# Patient Record
Sex: FEMALE | Race: WHITE | NOT HISPANIC OR LATINO | Employment: UNEMPLOYED | URBAN - METROPOLITAN AREA
[De-identification: names, ages, dates, MRNs, and addresses within clinical notes are randomized per-mention and may not be internally consistent; named-entity substitution may affect disease eponyms.]

---

## 2019-01-29 ENCOUNTER — HOSPITAL ENCOUNTER (EMERGENCY)
Facility: MEDICAL CENTER | Age: 44
End: 2019-01-29
Payer: MEDICAID

## 2019-01-29 VITALS
WEIGHT: 224.65 LBS | HEIGHT: 65 IN | DIASTOLIC BLOOD PRESSURE: 71 MMHG | BODY MASS INDEX: 37.43 KG/M2 | SYSTOLIC BLOOD PRESSURE: 123 MMHG | RESPIRATION RATE: 18 BRPM | HEART RATE: 91 BPM | OXYGEN SATURATION: 95 % | TEMPERATURE: 97 F

## 2019-01-29 LAB
S PYO AG THROAT QL: NORMAL
SIGNIFICANT IND 70042: NORMAL
SITE SITE: NORMAL
SOURCE SOURCE: NORMAL

## 2019-01-29 PROCEDURE — 87880 STREP A ASSAY W/OPTIC: CPT

## 2019-01-29 PROCEDURE — 302449 STATCHG TRIAGE ONLY (STATISTIC)

## 2019-01-29 PROCEDURE — 87081 CULTURE SCREEN ONLY: CPT

## 2019-01-29 NOTE — ED NOTES
Pt has to leave ER at this time because she has to be home in time for her son to get out of school.  Appt made at  now.  Informed that she was just roomed and she can go back now.  Unable to stay to be seen.  MARILYN paperwork signed.

## 2019-01-29 NOTE — ED NOTES
Throat swab sent to lab. Apologized for wait and assured that pt would be roomed as soon as possible.

## 2019-01-29 NOTE — ED TRIAGE NOTES
Amalia Gonzalez 43 y.o. female     Chief Complaint   Patient presents with   • Sore Throat     x1 week   • Cough     +wheezing   • Laryngitis        Pt returned to [facility] and educated on triage process.  Advised to notify RN with changes or concerns.

## 2019-01-30 ENCOUNTER — OFFICE VISIT (OUTPATIENT)
Dept: URGENT CARE | Facility: CLINIC | Age: 44
End: 2019-01-30
Payer: MEDICAID

## 2019-01-30 VITALS
RESPIRATION RATE: 16 BRPM | DIASTOLIC BLOOD PRESSURE: 70 MMHG | BODY MASS INDEX: 37.32 KG/M2 | HEIGHT: 65 IN | TEMPERATURE: 97.9 F | SYSTOLIC BLOOD PRESSURE: 124 MMHG | HEART RATE: 98 BPM | OXYGEN SATURATION: 96 % | WEIGHT: 224 LBS

## 2019-01-30 DIAGNOSIS — R05.9 COUGH: ICD-10-CM

## 2019-01-30 DIAGNOSIS — Z72.0 TOBACCO ABUSE: ICD-10-CM

## 2019-01-30 DIAGNOSIS — J20.9 ACUTE BRONCHITIS, UNSPECIFIED ORGANISM: ICD-10-CM

## 2019-01-30 PROCEDURE — 99406 BEHAV CHNG SMOKING 3-10 MIN: CPT | Performed by: NURSE PRACTITIONER

## 2019-01-30 PROCEDURE — 99204 OFFICE O/P NEW MOD 45 MIN: CPT | Mod: 25 | Performed by: NURSE PRACTITIONER

## 2019-01-30 RX ORDER — DEXTROMETHORPHAN HYDROBROMIDE AND PROMETHAZINE HYDROCHLORIDE 15; 6.25 MG/5ML; MG/5ML
5 SYRUP ORAL EVERY 4 HOURS PRN
Qty: 120 ML | Refills: 0 | Status: SHIPPED | OUTPATIENT
Start: 2019-01-30 | End: 2020-07-27

## 2019-01-30 RX ORDER — AZITHROMYCIN 250 MG/1
TABLET, FILM COATED ORAL
Qty: 6 TAB | Refills: 0 | Status: SHIPPED | OUTPATIENT
Start: 2019-01-30 | End: 2020-07-27

## 2019-01-30 RX ORDER — ALBUTEROL SULFATE 90 UG/1
2 AEROSOL, METERED RESPIRATORY (INHALATION) EVERY 6 HOURS PRN
Qty: 8.5 G | Refills: 0 | Status: SHIPPED | OUTPATIENT
Start: 2019-01-30 | End: 2020-07-27

## 2019-01-30 RX ORDER — METHYLPREDNISOLONE 4 MG/1
4 TABLET ORAL DAILY
Qty: 1 KIT | Refills: 0 | Status: SHIPPED | OUTPATIENT
Start: 2019-01-30 | End: 2020-07-27

## 2019-01-30 ASSESSMENT — ENCOUNTER SYMPTOMS
VOMITING: 0
FEVER: 0
SHORTNESS OF BREATH: 1
COUGH: 1
DIZZINESS: 0
SPUTUM PRODUCTION: 1
WHEEZING: 1
SORE THROAT: 0
EYE PAIN: 0
RHINORRHEA: 1
NAUSEA: 0
MYALGIAS: 1
CHILLS: 1

## 2019-01-30 ASSESSMENT — COPD QUESTIONNAIRES: COPD: 0

## 2019-01-31 LAB
S PYO SPEC QL CULT: NORMAL
SIGNIFICANT IND 70042: NORMAL
SITE SITE: NORMAL
SOURCE SOURCE: NORMAL

## 2019-01-31 NOTE — PROGRESS NOTES
Subjective:     Amalia Gonzalez is a 43 y.o. female who presents for Cough (SOB, congestion for a week)       Cough   This is a new problem. The current episode started in the past 7 days. The problem has been gradually worsening. The problem occurs constantly. The cough is productive of sputum. Associated symptoms include chills, myalgias, nasal congestion, postnasal drip, rhinorrhea, shortness of breath and wheezing. Pertinent negatives include no chest pain, fever, rash or sore throat. Nothing aggravates the symptoms. She has tried OTC cough suppressant for the symptoms. The treatment provided no relief. Her past medical history is significant for bronchitis. There is no history of COPD.     Past Medical History:   Diagnosis Date   • Allergy    • Depression     Pt. states was put on Lexapro   • Headache(784.0)    • Substance abuse (HCC)     meth pt. states stopped with pregnancy.   • Urinary tract infection, site not specified      Past Surgical History:   Procedure Laterality Date   • REPEAT C SECTION W TUBAL LIGATION N/A 2015    Procedure: REPEAT C SECTION WITH TUBAL LIGATION;  Surgeon: Bela Bansal M.D.;  Location: LABOR AND DELIVERY;  Service:    • PRIMARY C SECTION         • TONSILLECTOMY       Social History     Social History   • Marital status: Single     Spouse name: N/A   • Number of children: N/A   • Years of education: N/A     Occupational History   • Not on file.     Social History Main Topics   • Smoking status: Current Every Day Smoker     Packs/day: 0.25     Years: 20.00     Types: Cigarettes     Start date: 2015   • Smokeless tobacco: Never Used      Comment: 5-10 cigs/day   • Alcohol use No      Comment: occ   • Drug use: No      Comment: iv meth, last used in 2015   • Sexual activity: Yes     Partners: Male     Birth control/ protection: Condom     Other Topics Concern   • Not on file     Social History Narrative   • No narrative on file      Family History  "  Problem Relation Age of Onset   • Hyperlipidemia Mother    • Hypertension Father    • Hyperlipidemia Father     Review of Systems   Constitutional: Positive for chills and malaise/fatigue. Negative for fever.   HENT: Positive for congestion, postnasal drip and rhinorrhea. Negative for sore throat.    Eyes: Negative for pain.   Respiratory: Positive for cough, sputum production, shortness of breath and wheezing.    Cardiovascular: Negative for chest pain.   Gastrointestinal: Negative for nausea and vomiting.   Genitourinary: Negative for hematuria.   Musculoskeletal: Positive for myalgias.   Skin: Negative for rash.   Neurological: Negative for dizziness.     Allergies   Allergen Reactions   • Tape      Nath skin      Objective:   /70 (BP Location: Left arm, Patient Position: Sitting, BP Cuff Size: Adult)   Pulse 98   Temp 36.6 °C (97.9 °F) (Temporal)   Resp 16   Ht 1.651 m (5' 5\")   Wt 101.6 kg (224 lb)   LMP 01/05/2019 (Approximate)   SpO2 96%   Breastfeeding? No   BMI 37.28 kg/m²   Physical Exam   Constitutional: She is oriented to person, place, and time. She appears well-developed and well-nourished. No distress.   HENT:   Head: Normocephalic and atraumatic.   Right Ear: Tympanic membrane normal.   Left Ear: Tympanic membrane normal.   Nose: Nose normal. Right sinus exhibits no maxillary sinus tenderness and no frontal sinus tenderness. Left sinus exhibits no maxillary sinus tenderness and no frontal sinus tenderness.   Mouth/Throat: Uvula is midline, oropharynx is clear and moist and mucous membranes are normal. No posterior oropharyngeal edema, posterior oropharyngeal erythema or tonsillar abscesses. No tonsillar exudate.   Eyes: Pupils are equal, round, and reactive to light. Conjunctivae and EOM are normal. Right eye exhibits no discharge. Left eye exhibits no discharge.   Cardiovascular: Normal rate and regular rhythm.    No murmur heard.  Pulmonary/Chest: Effort normal. No respiratory " distress. She has no decreased breath sounds. She has wheezes. She has no rhonchi. She has no rales.   Abdominal: Soft. She exhibits no distension. There is no tenderness.   Neurological: She is alert and oriented to person, place, and time. She has normal reflexes. No sensory deficit.   Skin: Skin is warm, dry and intact.   Psychiatric: She has a normal mood and affect.         Assessment/Plan:   Assessment    1. Acute bronchitis, unspecified organism  azithromycin (ZITHROMAX) 250 MG Tab    promethazine-dextromethorphan (PROMETHAZINE-DM) 6.25-15 MG/5ML syrup    MethylPREDNISolone (MEDROL DOSEPAK) 4 MG Tablet Therapy Pack    albuterol 108 (90 Base) MCG/ACT Aero Soln inhalation aerosol   2. Cough  azithromycin (ZITHROMAX) 250 MG Tab    promethazine-dextromethorphan (PROMETHAZINE-DM) 6.25-15 MG/5ML syrup    MethylPREDNISolone (MEDROL DOSEPAK) 4 MG Tablet Therapy Pack    albuterol 108 (90 Base) MCG/ACT Aero Soln inhalation aerosol   3. Tobacco abuse       Advised to continue supportive care with Tylenol and/or ibuprofen for fevers and discomfort. Increased fluids and electrolytes.  Smoking cessation was discussed today for longer than 3 min. OTC Smoking cessation aids were discussed and pt. will consider such, however is not ready to quit at this time.   Patient given precautionary s/sx that mandate immediate follow up and evaluation in the ED. Advised of risks of not doing so.    DDX, Supportive care, and indications for immediate follow-up discussed with patient.    Instructed to return to clinic or nearest emergency department if we are not available for any change in condition, further concerns, or worsening of symptoms.    The patient demonstrated a good understanding and agreed with the treatment plan.

## 2019-03-27 ENCOUNTER — OFFICE VISIT (OUTPATIENT)
Dept: URGENT CARE | Facility: CLINIC | Age: 44
End: 2019-03-27
Payer: MEDICAID

## 2019-03-27 ENCOUNTER — HOSPITAL ENCOUNTER (OUTPATIENT)
Facility: MEDICAL CENTER | Age: 44
End: 2019-03-27
Attending: PHYSICIAN ASSISTANT
Payer: MEDICAID

## 2019-03-27 VITALS
BODY MASS INDEX: 34.99 KG/M2 | WEIGHT: 210 LBS | HEART RATE: 85 BPM | DIASTOLIC BLOOD PRESSURE: 78 MMHG | OXYGEN SATURATION: 95 % | TEMPERATURE: 98.3 F | SYSTOLIC BLOOD PRESSURE: 104 MMHG | RESPIRATION RATE: 16 BRPM | HEIGHT: 65 IN

## 2019-03-27 DIAGNOSIS — A59.01 TRICHOMONAL VAGINITIS: ICD-10-CM

## 2019-03-27 DIAGNOSIS — F17.200 SMOKER: ICD-10-CM

## 2019-03-27 DIAGNOSIS — A49.1 GROUP B STREPTOCOCCAL INFECTION: ICD-10-CM

## 2019-03-27 DIAGNOSIS — N89.8 VAGINAL DISCHARGE: ICD-10-CM

## 2019-03-27 DIAGNOSIS — Z91.89 AT RISK FOR SEXUALLY TRANSMITTED DISEASE DUE TO UNPROTECTED SEX: ICD-10-CM

## 2019-03-27 LAB
APPEARANCE UR: NORMAL
BILIRUB UR STRIP-MCNC: NEGATIVE MG/DL
COLOR UR AUTO: YELLOW
FORWARD REASON: SPWHY: NORMAL
FORWARDED TO LAB: SPWHR: NORMAL
GLUCOSE UR STRIP.AUTO-MCNC: NEGATIVE MG/DL
KETONES UR STRIP.AUTO-MCNC: NEGATIVE MG/DL
LEUKOCYTE ESTERASE UR QL STRIP.AUTO: NORMAL
NITRITE UR QL STRIP.AUTO: NEGATIVE
PH UR STRIP.AUTO: 5 [PH] (ref 5–8)
PROT UR QL STRIP: NEGATIVE MG/DL
RBC UR QL AUTO: NORMAL
SP GR UR STRIP.AUTO: 1.02
SPECIMEN SENT: SPWT1: NORMAL
UROBILINOGEN UR STRIP-MCNC: NORMAL MG/DL

## 2019-03-27 PROCEDURE — 99214 OFFICE O/P EST MOD 30 MIN: CPT | Performed by: PHYSICIAN ASSISTANT

## 2019-03-27 PROCEDURE — 81002 URINALYSIS NONAUTO W/O SCOPE: CPT | Performed by: PHYSICIAN ASSISTANT

## 2019-03-27 PROCEDURE — 99406 BEHAV CHNG SMOKING 3-10 MIN: CPT | Performed by: PHYSICIAN ASSISTANT

## 2019-03-27 RX ORDER — AZITHROMYCIN 500 MG/1
1000 TABLET, FILM COATED ORAL ONCE
Qty: 2 TAB | Refills: 0 | Status: SHIPPED | OUTPATIENT
Start: 2019-03-27 | End: 2019-03-27

## 2019-03-27 ASSESSMENT — ENCOUNTER SYMPTOMS
MYALGIAS: 0
FEVER: 0
VOMITING: 0
ABDOMINAL PAIN: 1
NAUSEA: 0
DIARRHEA: 0
FLANK PAIN: 0
CHILLS: 0

## 2019-03-27 NOTE — PROGRESS NOTES
"Subjective:   Amalia Gonzalez is a 44 y.o. female who presents for Other (pt wants to get STD tested)    This is a new problem.  Patient presents to urgent care with concern for STD.  She had unprotected sex approximately 2-3 weeks ago.  One week ago she began to develop copious yellow-green vaginal discharge with odor as well as dysuria.  She denies urgency or frequency.  She has had no fever.  She reports that using her tampon with her.  Last week was rather painful.    Patient is breast-feeding her 4-year-old daughter only at night.  She is concerned about possibly passing STD through the breastmilk to her daughter.        Other   Associated symptoms include abdominal pain. Pertinent negatives include no chills, fever, myalgias, nausea or vomiting.     Review of Systems   Constitutional: Negative for chills, fever and malaise/fatigue.   Gastrointestinal: Positive for abdominal pain. Negative for diarrhea, nausea and vomiting.   Genitourinary: Positive for dysuria. Negative for flank pain, frequency, hematuria and urgency.   Musculoskeletal: Negative for myalgias.   All other systems reviewed and are negative.    Allergies   Allergen Reactions   • Tape      Nath skin        Objective:   /78   Pulse 85   Temp 36.8 °C (98.3 °F)   Resp 16   Ht 1.651 m (5' 5\")   Wt 95.3 kg (210 lb)   SpO2 95%   BMI 34.95 kg/m²   Physical Exam   Constitutional: She is oriented to person, place, and time. She appears well-developed and well-nourished.   HENT:   Head: Normocephalic and atraumatic.   Right Ear: Tympanic membrane, external ear and ear canal normal.   Left Ear: Tympanic membrane, external ear and ear canal normal.   Nose: Nose normal.   Mouth/Throat: Uvula is midline, oropharynx is clear and moist and mucous membranes are normal. No oropharyngeal exudate.   Eyes: Pupils are equal, round, and reactive to light. Conjunctivae and EOM are normal.   Neck: Normal range of motion. Neck supple.   Cardiovascular: " Normal rate, regular rhythm and normal heart sounds.  Exam reveals no gallop and no friction rub.    No murmur heard.  Pulmonary/Chest: Effort normal and breath sounds normal. No respiratory distress. She has no wheezes. She has no rales.   Abdominal: Soft. Bowel sounds are normal. She exhibits no distension and no mass. There is no hepatosplenomegaly. There is tenderness in the right lower quadrant. There is no rigidity, no rebound and no guarding.   Genitourinary: Rectum normal and uterus normal. Pelvic exam was performed with patient supine. There is no rash, tenderness, lesion or injury on the right labia. There is no rash, tenderness, lesion or injury on the left labia. Cervix exhibits discharge. Cervix exhibits no motion tenderness and no friability. Right adnexum displays no mass, no tenderness and no fullness. Left adnexum displays no mass, no tenderness and no fullness. No bleeding in the vagina. No foreign body in the vagina. Vaginal discharge found.   Musculoskeletal: Normal range of motion. She exhibits no edema, tenderness or deformity.   Lymphadenopathy:        Head (right side): No submental, no submandibular and no tonsillar adenopathy present.        Head (left side): No submental, no submandibular and no tonsillar adenopathy present.     She has no cervical adenopathy.        Right: No supraclavicular adenopathy present.        Left: No supraclavicular adenopathy present.   Neurological: She is alert and oriented to person, place, and time. She has normal strength. No cranial nerve deficit or sensory deficit. Coordination normal.   Skin: Skin is warm and dry. No rash noted.   Psychiatric: She has a normal mood and affect. Judgment normal.   Vitals reviewed.          Assessment/Plan:   Assessment    1. Vaginal discharge  - CHLAMYDIA/GC PCR URINE OR SWAB; Future  - POCT Urinalysis  - URINE CULTURE  - azithromycin (ZITHROMAX) 500 MG tablet; Take 2 Tabs by mouth Once for 1 dose.  Dispense: 2 Tab;  Refill: 0  - cefTRIAXone (ROCEPHIN) 250 mg, lidocaine (XYLOCAINE) 1 % 0.9 mL for IM use; 250 mg by Intramuscular route Once.    2. Lactating mother    3. At risk for sexually transmitted disease due to unprotected sex  - CHLAMYDIA/GC PCR URINE OR SWAB; Future  - POCT Urinalysis  - URINE CULTURE  - azithromycin (ZITHROMAX) 500 MG tablet; Take 2 Tabs by mouth Once for 1 dose.  Dispense: 2 Tab; Refill: 0  - cefTRIAXone (ROCEPHIN) 250 mg, lidocaine (XYLOCAINE) 1 % 0.9 mL for IM use; 250 mg by Intramuscular route Once.    4. Smoker  Greater than 3 minutes spent counseling on nicotine dependence, associated disease process and affect on present illness. Counseled regarding cessation. Not ready to quit at this time.       Testing for chlamydia and gonorrhea as well as vaginal pathogens obtained.  Patient will be empirically treated with Rocephin 250 mg IM and Zithromax 1 g dose x1.  Recommend use of condoms.  If testing positive partner will need to be tested and treated.  Urinalysis has trace leukocyte esterase and trace blood.  However, I suspect this is contaminated from vaginal discharge.  We will go ahead and send a urine culture however I am not going to additionally treat for UTI at this time.    Counseled patient on safety of breast-feeding with these medications.  Did review that if they do go through breast milk but in small amount and with her only breast-feeding at night that should not be an issue.      Differential diagnosis, natural history, supportive care, and indications for immediate follow-up discussed.    If not improving in 3-5 days, F/U with PCP or return to  or sooner if worsens  Strict ER precautions given.    Please note that this note was created using voice recognition speech to text software. Every effort has been made to correct obvious errors.  However, I expect there are errors of grammar and possibly context that were not discovered prior to finalizing the note

## 2019-04-04 ENCOUNTER — TELEPHONE (OUTPATIENT)
Dept: URGENT CARE | Facility: CLINIC | Age: 44
End: 2019-04-04

## 2019-04-04 DIAGNOSIS — N89.8 VAGINAL DISCHARGE: ICD-10-CM

## 2019-04-04 DIAGNOSIS — Z91.89 AT RISK FOR SEXUALLY TRANSMITTED DISEASE DUE TO UNPROTECTED SEX: ICD-10-CM

## 2019-04-04 RX ORDER — AMOXICILLIN 500 MG/1
500 CAPSULE ORAL 3 TIMES DAILY
Qty: 15 CAP | Refills: 0 | Status: SHIPPED | OUTPATIENT
Start: 2019-04-04 | End: 2019-04-09

## 2019-04-04 RX ORDER — METRONIDAZOLE 500 MG/1
500 TABLET ORAL 2 TIMES DAILY
Qty: 14 TAB | Refills: 0 | Status: SHIPPED | OUTPATIENT
Start: 2019-04-04 | End: 2019-04-11

## 2019-04-04 NOTE — TELEPHONE ENCOUNTER
Attempted to contact patient to notify of testing + for Trichomonas, Gardnerella and urine culture + for Group B Strep.  No answer. Voicemail left for patient to return call and speak with MA at [facility] as this provider is out of town and not available. I will order Metronidazole and Amoxicillin for  at pharmacy. Partner will require treatment for Trichomonas

## 2019-04-05 NOTE — TELEPHONE ENCOUNTER
Pt would like you to call her. She had a lot of questions I wasn't comfortable answering.  I did tell her there is 2 medications waiting for her at the pharmacy.

## 2019-05-30 ENCOUNTER — OFFICE VISIT (OUTPATIENT)
Dept: URGENT CARE | Facility: CLINIC | Age: 44
End: 2019-05-30
Payer: MEDICAID

## 2019-05-30 ENCOUNTER — HOSPITAL ENCOUNTER (OUTPATIENT)
Facility: MEDICAL CENTER | Age: 44
End: 2019-05-30
Attending: PHYSICIAN ASSISTANT
Payer: MEDICAID

## 2019-05-30 VITALS
SYSTOLIC BLOOD PRESSURE: 128 MMHG | DIASTOLIC BLOOD PRESSURE: 82 MMHG | BODY MASS INDEX: 34.99 KG/M2 | RESPIRATION RATE: 16 BRPM | WEIGHT: 210 LBS | HEIGHT: 65 IN | HEART RATE: 117 BPM | OXYGEN SATURATION: 97 % | TEMPERATURE: 98.1 F

## 2019-05-30 DIAGNOSIS — N89.8 VAGINAL DISCHARGE: ICD-10-CM

## 2019-05-30 LAB
APPEARANCE UR: NORMAL
BILIRUB UR STRIP-MCNC: NEGATIVE MG/DL
COLOR UR AUTO: YELLOW
FORWARD REASON: SPWHY: NORMAL
FORWARDED TO LAB: SPWHR: NORMAL
GLUCOSE UR STRIP.AUTO-MCNC: NEGATIVE MG/DL
KETONES UR STRIP.AUTO-MCNC: NEGATIVE MG/DL
LEUKOCYTE ESTERASE UR QL STRIP.AUTO: NORMAL
NITRITE UR QL STRIP.AUTO: NEGATIVE
PH UR STRIP.AUTO: 5.5 [PH] (ref 5–8)
PROT UR QL STRIP: 30 MG/DL
RBC UR QL AUTO: NORMAL
SP GR UR STRIP.AUTO: 1.03
SPECIMEN SENT (2ND): SPWT2: NORMAL
SPECIMEN SENT (3RD): SPWT3: NORMAL
SPECIMEN SENT: SPWT1: NORMAL
UROBILINOGEN UR STRIP-MCNC: 0.2 MG/DL

## 2019-05-30 PROCEDURE — 81002 URINALYSIS NONAUTO W/O SCOPE: CPT | Performed by: PHYSICIAN ASSISTANT

## 2019-05-30 PROCEDURE — 99214 OFFICE O/P EST MOD 30 MIN: CPT | Performed by: PHYSICIAN ASSISTANT

## 2019-05-30 RX ORDER — METRONIDAZOLE 500 MG/1
500 TABLET ORAL 2 TIMES DAILY
Qty: 14 TAB | Refills: 0 | Status: SHIPPED | OUTPATIENT
Start: 2019-05-30 | End: 2019-06-06

## 2019-05-30 ASSESSMENT — ENCOUNTER SYMPTOMS
DIARRHEA: 0
FLANK PAIN: 0
FEVER: 0
VOMITING: 0
NAUSEA: 0
CONSTIPATION: 0
VAGINITIS: 1
SHORTNESS OF BREATH: 0
CHILLS: 0
ABDOMINAL PAIN: 0
MUSCULOSKELETAL NEGATIVE: 1
DIZZINESS: 0

## 2019-05-31 NOTE — PROGRESS NOTES
Subjective:      Amlaia Gonzalez is a 44 y.o. female who presents with Vaginitis (hx of STI, discharged, itchy, painful urination, raw x a week)            Vaginitis   The patient's primary symptoms include genital itching, a genital odor and vaginal discharge. The patient's pertinent negatives include no genital rash or pelvic pain. This is a new problem. The current episode started in the past 7 days. The problem occurs constantly. The problem has been unchanged. The pain is mild. The problem affects both sides. She is not pregnant. Associated symptoms include dysuria. Pertinent negatives include no abdominal pain, chills, constipation, diarrhea, fever, flank pain, frequency, hematuria, nausea, rash, urgency or vomiting. The vaginal discharge was malodorous and grey. There has been no bleeding. The symptoms are aggravated by urinating. She has tried nothing for the symptoms. She is sexually active. No, her partner does not have an STD. She uses tubal ligation for contraception. Her past medical history is significant for an abdominal surgery, a gynecological surgery and an STD.     Patient presents to urgent care reporting a 1 week history of malodorous vaginal discharge with associated irritation and dysuria. She states she was previously diagnosed with trichomonas 2 months ago. She recently had unprotected intercourse with the same partner and states he was never treated.     Review of Systems   Constitutional: Negative for chills and fever.   HENT: Negative for congestion.    Respiratory: Negative for shortness of breath.    Cardiovascular: Negative for chest pain.   Gastrointestinal: Negative for abdominal pain, constipation, diarrhea, nausea and vomiting.   Genitourinary: Positive for dysuria and vaginal discharge. Negative for flank pain, frequency, hematuria, pelvic pain and urgency.        + malodorous vaginal discharge   Musculoskeletal: Negative.    Skin: Negative for itching and rash.   Neurological:  "Negative for dizziness.        Objective:     /82   Pulse (!) 117   Temp 36.7 °C (98.1 °F)   Resp 16   Ht 1.651 m (5' 5\")   Wt 95.3 kg (210 lb)   SpO2 97%   BMI 34.95 kg/m²      Physical Exam   Constitutional: She is oriented to person, place, and time. She appears well-developed and well-nourished. No distress.   HENT:   Head: Normocephalic and atraumatic.   Eyes: Pupils are equal, round, and reactive to light. Conjunctivae are normal.   Neck: Normal range of motion.   Cardiovascular: Normal rate.    Pulmonary/Chest: Effort normal.   Abdominal: Soft. Bowel sounds are normal. She exhibits no distension. There is no tenderness. There is no guarding.   Genitourinary:   Genitourinary Comments: Exam declined   Musculoskeletal: Normal range of motion.   Neurological: She is alert and oriented to person, place, and time.   Skin: Skin is warm and dry. She is not diaphoretic.   Psychiatric: She has a normal mood and affect. Her behavior is normal.   Nursing note and vitals reviewed.         PMH:  has a past medical history of Allergy; Depression (2004); Headache(784.0); Substance abuse ([facility]) (2015); and Urinary tract infection, site not specified.  MEDS:   Current Outpatient Prescriptions:   •  metroNIDAZOLE (FLAGYL) 500 MG Tab, Take 1 Tab by mouth 2 Times a Day for 7 days., Disp: 14 Tab, Rfl: 0  •  azithromycin (ZITHROMAX) 250 MG Tab, Take 2 tablets by mouth on day one. Take one tablet by mouth the remaining days until gone (Patient not taking: Reported on 5/30/2019), Disp: 6 Tab, Rfl: 0  •  promethazine-dextromethorphan (PROMETHAZINE-DM) 6.25-15 MG/5ML syrup, Take 5 mL by mouth every four hours as needed. (Patient not taking: Reported on 5/30/2019), Disp: 120 mL, Rfl: 0  •  MethylPREDNISolone (MEDROL DOSEPAK) 4 MG Tablet Therapy Pack, Take 1 Tab by mouth every day. (Patient not taking: Reported on 5/30/2019), Disp: 1 Kit, Rfl: 0  •  albuterol 108 (90 Base) MCG/ACT Aero Soln inhalation aerosol, Inhale 2 Puffs " by mouth every 6 hours as needed for Shortness of Breath. (Patient not taking: Reported on 2019), Disp: 8.5 g, Rfl: 0  •  ibuprofen (MOTRIN) 600 MG Tab, Take 1 Tab by mouth every 6 hours as needed (Cramping). (Patient not taking: Reported on 2019), Disp: 30 Tab, Rfl: 3  •  Prenatal MV-Min-Fe Fum-FA-DHA (PRENATAL 1 PO), Take  by mouth., Disp: , Rfl:   ALLERGIES:   Allergies   Allergen Reactions   • Tape      Burns skin     SURGHX:   Past Surgical History:   Procedure Laterality Date   • REPEAT C SECTION W TUBAL LIGATION N/A 2015    Procedure: REPEAT C SECTION WITH TUBAL LIGATION;  Surgeon: Bela Bansal M.D.;  Location: LABOR AND DELIVERY;  Service:    • PRIMARY C SECTION         • TONSILLECTOMY       SOCHX:  reports that she has been smoking Cigarettes.  She started smoking about 4 years ago. She has a 5.00 pack-year smoking history. She has never used smokeless tobacco. She reports that she does not drink alcohol or use drugs.  FH: family history includes Hyperlipidemia in her father and mother; Hypertension in her father.     POCT Urinalysis:  Component Results     Component Value Ref Range & Units Status   POC Color yellow  Negative Final   POC Appearance cloudy  Negative Final   POC Leukocyte Esterase trace  Negative Final   POC Nitrites negative  Negative Final   POC Urobiligen 0.2  Negative (0.2) mg/dL Final   POC Protein 30  Negative mg/dL Final   POC Urine PH 5.5  5.0 - 8.0 Final   POC Blood trace-intact  Negative Final   POC Specific Gravity 1.030  <1.005 - >1.030 Final   POC Ketones negative  Negative mg/dL Final   POC Bilirubin negative  Negative mg/dL Final   POC Glucose negative  Negative mg/dL Final   Last Resulted Time   Thu May 30, 2019  4:05 PM       Assessment/Plan:     1. Vaginal discharge  - POCT Urinalysis --> trace leuks and blood, otherwise normal  - VAGINAL PATHOGENS DNA PANEL; Future  - CHLAMYDIA/GC PCR URINE OR SWAB; Future  - URINE CULTURE(NEW); Future  -  metroNIDAZOLE (FLAGYL) 500 MG Tab; Take 1 Tab by mouth 2 Times a Day for 7 days.  Dispense: 14 Tab; Refill: 0    Will treat empirically for suspected Trichomonas or possible BV with 1 week course of Metronidazole. Avoid any alcohol intake while taking course of antibiotics. Pending swab results. The patient demonstrated a good understanding and agreed with the treatment plan.

## 2019-06-11 ENCOUNTER — TELEPHONE (OUTPATIENT)
Dept: URGENT CARE | Facility: CLINIC | Age: 44
End: 2019-06-11

## 2019-06-11 NOTE — TELEPHONE ENCOUNTER
Attempted to contact patient regarding vaginal pathogens and urine culture results. Phone number listed not accepting incoming calls.

## 2020-05-07 ENCOUNTER — OFFICE VISIT (OUTPATIENT)
Dept: URGENT CARE | Facility: CLINIC | Age: 45
End: 2020-05-07
Payer: MEDICAID

## 2020-05-07 VITALS
OXYGEN SATURATION: 96 % | DIASTOLIC BLOOD PRESSURE: 76 MMHG | WEIGHT: 200 LBS | SYSTOLIC BLOOD PRESSURE: 112 MMHG | RESPIRATION RATE: 16 BRPM | TEMPERATURE: 98.2 F | BODY MASS INDEX: 33.32 KG/M2 | HEART RATE: 106 BPM | HEIGHT: 65 IN

## 2020-05-07 DIAGNOSIS — R21 SKIN RASH: ICD-10-CM

## 2020-05-07 DIAGNOSIS — R51.9 HEADACHE, UNSPECIFIED HEADACHE TYPE: ICD-10-CM

## 2020-05-07 PROCEDURE — 99214 OFFICE O/P EST MOD 30 MIN: CPT | Performed by: NURSE PRACTITIONER

## 2020-05-07 RX ORDER — FLUCONAZOLE 150 MG/1
TABLET ORAL
COMMUNITY
Start: 2020-04-10 | End: 2020-07-29

## 2020-05-07 RX ORDER — CEPHALEXIN 500 MG/1
CAPSULE ORAL
COMMUNITY
Start: 2020-04-09 | End: 2020-07-27

## 2020-05-07 RX ORDER — CARISOPRODOL 350 MG/1
350 TABLET ORAL EVERY 8 HOURS PRN
Qty: 15 TAB | Refills: 0 | Status: SHIPPED | OUTPATIENT
Start: 2020-05-07 | End: 2020-05-14

## 2020-05-07 RX ORDER — SULFAMETHOXAZOLE AND TRIMETHOPRIM 800; 160 MG/1; MG/1
TABLET ORAL
COMMUNITY
Start: 2020-04-09 | End: 2020-07-27

## 2020-05-07 RX ORDER — TRIAMCINOLONE ACETONIDE 1 MG/G
CREAM TOPICAL
COMMUNITY
Start: 2020-04-10 | End: 2020-07-29

## 2020-05-07 RX ORDER — PREDNISONE 10 MG/1
TABLET ORAL
Qty: 15 TAB | Refills: 0 | Status: SHIPPED | OUTPATIENT
Start: 2020-05-07 | End: 2020-07-29

## 2020-05-07 ASSESSMENT — ENCOUNTER SYMPTOMS
FEVER: 0
HEADACHES: 1
CHILLS: 0

## 2020-05-07 NOTE — PROGRESS NOTES
Subjective:      Amalia Gonzalez is a 45 y.o. female who presents with Rash (abdomin, arms back and chest x1 month )    Past Medical History:   Diagnosis Date   • Allergy    • Depression 2004    Pt. states was put on Lexapro   • Headache(784.0)    • Substance abuse (HCC) 2015    meth pt. states stopped with pregnancy.   • Urinary tract infection, site not specified      Social History     Socioeconomic History   • Marital status: Single     Spouse name: Not on file   • Number of children: Not on file   • Years of education: Not on file   • Highest education level: Not on file   Occupational History   • Not on file   Social Needs   • Financial resource strain: Not on file   • Food insecurity     Worry: Not on file     Inability: Not on file   • Transportation needs     Medical: Not on file     Non-medical: Not on file   Tobacco Use   • Smoking status: Current Every Day Smoker     Packs/day: 0.25     Years: 20.00     Pack years: 5.00     Types: Cigarettes     Start date: 2/1/2015   • Smokeless tobacco: Never Used   • Tobacco comment: 5-10 cigs/day   Substance and Sexual Activity   • Alcohol use: No     Comment: occ   • Drug use: No     Types: Methamphetamines     Comment: iv meth, last used in 2/2015   • Sexual activity: Yes     Partners: Male     Birth control/protection: Condom   Lifestyle   • Physical activity     Days per week: Not on file     Minutes per session: Not on file   • Stress: Not on file   Relationships   • Social connections     Talks on phone: Not on file     Gets together: Not on file     Attends [employer] service: Not on file     Active member of club or organization: Not on file     Attends meetings of clubs or organizations: Not on file     Relationship status: Not on file   • Intimate partner violence     Fear of current or ex partner: Not on file     Emotionally abused: Not on file     Physically abused: Not on file     Forced sexual activity: Not on file   Other Topics Concern   • Not on file    Social History Narrative   • Not on file     Family History   Problem Relation Age of Onset   • Hyperlipidemia Mother    • Hypertension Father    • Hyperlipidemia Father        Allergies: Tape    Patient 45-year-old female who presents with multiple complaints.    Complaint #1:  Rash to the upper extremities, trunk, and back.  Symptoms started 1 month ago.  Patient was on antibiotics for a boil in the groin area and that has resolved.  She states at some point after finishing the antibiotic she developed this rash.  She has not had any itching or swelling noted with this.  She states she believes the rash to be spreading and at times the rash appears to be darker than at other times.  No fever, aches, or chills. Patient states she did not have an initial herald patch, but states that the rash started on her upper extremities and was widespread immediately.      Complaint #2:  Intestinal discomfort and bloating.  Patient states that over the last 1 to 2 weeks every time she eats anything she feels bloated with a distended abdomen followed by diarrhea.  No nausea or vomiting.  No fever.    Complaint #3:  Headaches.  Patient states that she has had a longstanding history of migraine headaches that have not bothered her for more than a year.  Patient states around the time of the onset of the rash she believes she started having headaches again.  She describes the headache is bilateral frontal area, with intermittent light sensitivity.  Nothing she has tried over-the-counter as far as NSAIDs has been helpful.  Patient states that she has taken Soma in the past for her headaches and that this is been helpful.    Complaint #4:    Intermittent circular yellow light that she notices in her visual field on the right eye.  This started over the last 1 to 2 months.  Patient states that several weeks ago she was cleaning something at home and felt a small pop in her eye and then noted that she had a sub-conjunctival  "hemorrhage.  That has resolved, but patient states that she believes she sees these flashes of light periodically ever since.        Other   This is a new problem. The current episode started in the past 7 days. The problem occurs constantly. The problem has been unchanged. Associated symptoms include headaches and a rash. Pertinent negatives include no chills or fever. Associated symptoms comments: Rash, headache. Nothing aggravates the symptoms. She has tried nothing for the symptoms. The treatment provided no relief.       Review of Systems   Constitutional: Negative for chills and fever.   Skin: Positive for rash.   Neurological: Positive for headaches.   All other systems reviewed and are negative.         Objective:     /76   Pulse (!) 106   Temp 36.8 °C (98.2 °F) (Temporal)   Resp 16   Ht 1.651 m (5' 5\")   Wt 90.7 kg (200 lb)   SpO2 96%   BMI 33.28 kg/m²      Physical Exam  Vitals signs reviewed.   Constitutional:       Appearance: Normal appearance.   HENT:      Head: Normocephalic and atraumatic.      Right Ear: Tympanic membrane, ear canal and external ear normal.      Left Ear: Tympanic membrane, ear canal and external ear normal.      Nose: Nose normal.      Mouth/Throat:      Mouth: Mucous membranes are moist.   Eyes:      General: No visual field deficit.     Extraocular Movements: Extraocular movements intact.      Conjunctiva/sclera: Conjunctivae normal.      Pupils: Pupils are equal, round, and reactive to light.   Neck:      Musculoskeletal: Normal range of motion and neck supple.   Cardiovascular:      Rate and Rhythm: Normal rate and regular rhythm.      Heart sounds: Normal heart sounds.   Pulmonary:      Effort: Pulmonary effort is normal.      Breath sounds: Normal breath sounds.   Musculoskeletal: Normal range of motion.   Skin:     General: Skin is warm and dry.      Capillary Refill: Capillary refill takes less than 2 seconds.      Findings: Rash present.   Neurological:      " General: No focal deficit present.      Mental Status: She is alert and oriented to person, place, and time.      GCS: GCS eye subscore is 4. GCS verbal subscore is 5. GCS motor subscore is 6.      Cranial Nerves: Cranial nerves are intact. No cranial nerve deficit, dysarthria or facial asymmetry.      Sensory: Sensation is intact. No sensory deficit.      Motor: Motor function is intact. No weakness, tremor, atrophy, abnormal muscle tone, seizure activity or pronator drift.      Coordination: Coordination is intact. Romberg sign negative. Coordination normal. Finger-Nose-Finger Test normal.      Gait: Gait is intact. Gait normal.      Deep Tendon Reflexes: Reflexes normal.      Comments: Cranial nerves II through XII intact.  Cerebellar function intact.  Motor coordination intact.  Romberg negative.  No pronator drift.  Pupils equally round and reactive, EOMs intact.  Facial features symmetric with equal movement.  Speech is clear and logical.  Shoulder shrug equal.   5/5 and equal in the upper extremities.  Strength 5/5 and equal in the upper and lower extremities.  Gait is even and steady.  Patient is awake, alert, oriented x3.  Sensation intact.   Psychiatric:         Mood and Affect: Mood normal.         Behavior: Behavior normal.       Patient has a generalized rash to the upper extremities, trunk, back, and chest.  Rash is light pink and annular but no scaling.  Mild swelling to these areas.  No excoriation.            Assessment/Plan:     1. Skin rash  2. Headache, unspecified headache type  3.  Intermittent diarrhea  4.  Nonacute visual changes    Strict ER precautions given for decreasing vision or persistent symptoms with her right eye; otherwise I have advised patient to follow-up with her ophthalmologist  Prednisone p.o.  Soma PRN for headache; clearly stated no driving or alcohol with this medication.  Checked patient's  and find no evidence for narcotic misuse  Brat diet, bland diet  Patient  is advised also to schedule a follow-up appointment with her primary care physician  Return to urgent care otherwise for any further questions or concerns  Strict ER precautions as discussed above for worsening of symptoms

## 2020-07-27 ENCOUNTER — OFFICE VISIT (OUTPATIENT)
Dept: URGENT CARE | Facility: CLINIC | Age: 45
End: 2020-07-27
Payer: MEDICAID

## 2020-07-27 VITALS
DIASTOLIC BLOOD PRESSURE: 68 MMHG | RESPIRATION RATE: 14 BRPM | TEMPERATURE: 98.2 F | OXYGEN SATURATION: 97 % | HEART RATE: 104 BPM | WEIGHT: 208 LBS | SYSTOLIC BLOOD PRESSURE: 102 MMHG | BODY MASS INDEX: 34.66 KG/M2 | HEIGHT: 65 IN

## 2020-07-27 DIAGNOSIS — R22.0 FACIAL SWELLING: ICD-10-CM

## 2020-07-27 DIAGNOSIS — L02.01 ABSCESS OF FACE: ICD-10-CM

## 2020-07-27 PROCEDURE — 99214 OFFICE O/P EST MOD 30 MIN: CPT | Performed by: NURSE PRACTITIONER

## 2020-07-27 RX ORDER — HYDROCODONE BITARTRATE AND ACETAMINOPHEN 5; 325 MG/1; MG/1
1 TABLET ORAL EVERY 4 HOURS PRN
Qty: 6 TAB | Refills: 0 | Status: SHIPPED | OUTPATIENT
Start: 2020-07-27 | End: 2020-08-03

## 2020-07-27 RX ORDER — AMOXICILLIN AND CLAVULANATE POTASSIUM 875; 125 MG/1; MG/1
1 TABLET, FILM COATED ORAL 2 TIMES DAILY
Qty: 14 TAB | Refills: 0 | Status: ON HOLD | OUTPATIENT
Start: 2020-07-27 | End: 2020-07-31

## 2020-07-28 ASSESSMENT — ENCOUNTER SYMPTOMS
DIZZINESS: 0
EYE REDNESS: 0
VOMITING: 0
HEADACHES: 0
FEVER: 0
NECK PAIN: 0
NAUSEA: 0
COUGH: 0
EYE DISCHARGE: 0
SENSORY CHANGE: 0
CHILLS: 0

## 2020-07-28 ASSESSMENT — LIFESTYLE VARIABLES: SUBSTANCE_ABUSE: 0

## 2020-07-28 NOTE — PROGRESS NOTES
Subjective:      Amalia Gonzalez is a 45 y.o. female who presents with Jaw Pain (x 2 days.  Pt. noticed the swelling and pain in R jaw radiatind down to her neck.  Denies any tooth pain or open sores in mouth. She states she is prone to boils and cysts and needs a referral to see someone regarding this.  )    Reviewed past medical, surgical and family history. Reviewed prescription and OTC medications with patient in electronic health record today  Allergies: Tape                HPI this is a new problem.  Amalia is a 45-year-old female presents with right-sided cheek pain and swelling x2 days.  The swelling is gotten worse over the last 24 hours.  She has pain that radiates down to her neck from the side of the swelling.  She denies tooth pain or sores in her mouth.  She does have a positive history of abscesses.  She has not seen the dentist for a long time.  She has no difficulty swallowing or opening her mouth.  Treatments tried: Warm compresses.  That seemed to help alleviate her discomfort a little.  She has had no spontaneous drainage.  No other aggravating or alleviating factors.    Review of Systems   Constitutional: Negative for chills and fever.   Eyes: Negative for discharge and redness.   Respiratory: Negative for cough.    Gastrointestinal: Negative for nausea and vomiting.   Musculoskeletal: Negative for neck pain.   Skin: Negative for rash.   Neurological: Negative for dizziness, sensory change and headaches.   Endo/Heme/Allergies: Negative for environmental allergies.   Psychiatric/Behavioral: Negative for substance abuse.       Allergies   Allergen Reactions   • Tape      Burns skin     Past Medical History:   Diagnosis Date   • Allergy    • Depression 2004    Pt. states was put on Lexapro   • Headache(784.0)    • Substance abuse (HCC) 2015    meth pt. states stopped with pregnancy.   • Urinary tract infection, site not specified      Past Surgical History:   Procedure Laterality Date   • REPEAT  "C SECTION W TUBAL LIGATION N/A 2015    Procedure: REPEAT C SECTION WITH TUBAL LIGATION;  Surgeon: Bela Bansal M.D.;  Location: LABOR AND DELIVERY;  Service:    • PRIMARY C SECTION         • TONSILLECTOMY       Family History   Problem Relation Age of Onset   • Hyperlipidemia Mother    • Hypertension Father    • Hyperlipidemia Father      Social History     Tobacco Use   • Smoking status: Current Every Day Smoker     Packs/day: 0.25     Years: 20.00     Pack years: 5.00     Types: Cigarettes     Start date: 2015   • Smokeless tobacco: Never Used   • Tobacco comment: 5-10 cigs/day   Substance Use Topics   • Alcohol use: No     Comment: occ     Patient Active Problem List   Diagnosis   • History of  delivery X 2 wants repeat C/S and BTL   • AMA (advanced maternal age) multigravida 35+   • History of substance abuse (HCC)   • History of depression   • Encounter for tubal ligation counseling/ signed 8/19/15     Current Outpatient Medications on File Prior to Visit   Medication Sig Dispense Refill   • triamcinolone acetonide (KENALOG) 0.1 % Cream APPLY AND RUB IN A THIN FILM TO AFECTED AREAS BID IN THE AM AND PM     • fluconazole (DIFLUCAN) 150 MG tablet      • predniSONE (DELTASONE) 10 MG Tab Take 1 tablet by mouth 3 times daily for 5 days 15 Tab 0   • Prenatal MV-Min-Fe Fum-FA-DHA (PRENATAL 1 PO) Take  by mouth.       No current facility-administered medications on file prior to visit.         Objective:     /68   Pulse (!) 104   Temp 36.8 °C (98.2 °F) (Temporal)   Resp 14   Ht 1.651 m (5' 5\")   Wt 94.3 kg (208 lb)   LMP 2020   SpO2 97%   BMI 34.61 kg/m²      Physical Exam  Vitals signs and nursing note reviewed.   Constitutional:       General: She is not in acute distress.     Appearance: She is well-developed. She is not ill-appearing or toxic-appearing.   HENT:      Head: Normocephalic.        Right Ear: Hearing, tympanic membrane, ear canal and external ear " normal.      Left Ear: Hearing, tympanic membrane, ear canal and external ear normal.      Nose: Nose normal.      Mouth/Throat:      Mouth: Mucous membranes are moist.      Dentition: Abnormal dentition. Gingival swelling present. No dental tenderness or gum lesions.      Pharynx: Oropharynx is clear. Uvula midline.     Eyes:      Pupils: Pupils are equal, round, and reactive to light.   Neck:      Musculoskeletal: Normal range of motion and neck supple.   Cardiovascular:      Rate and Rhythm: Normal rate and regular rhythm.      Pulses: Normal pulses.      Heart sounds: Normal heart sounds. No murmur.   Pulmonary:      Effort: Pulmonary effort is normal. No respiratory distress.      Breath sounds: Normal breath sounds.   Musculoskeletal: Normal range of motion.   Lymphadenopathy:      Head:      Right side of head: Submandibular adenopathy present. No submental or tonsillar adenopathy.      Left side of head: No submental, submandibular or tonsillar adenopathy.      Cervical: No cervical adenopathy.      Right cervical: No superficial cervical adenopathy.     Left cervical: No superficial cervical adenopathy.   Skin:     General: Skin is warm and dry.   Neurological:      Mental Status: She is alert and oriented to person, place, and time.      Sensory: No sensory deficit.      Gait: Gait normal.   Psychiatric:         Mood and Affect: Mood normal.         Speech: Speech normal.         Behavior: Behavior normal.         Thought Content: Thought content normal.         Judgment: Judgment normal.                 Assessment/Plan:       1. Facial swelling    - amoxicillin-clavulanate (AUGMENTIN) 875-125 MG Tab; Take 1 Tab by mouth 2 times a day for 7 days.  Dispense: 14 Tab; Refill: 0  - HYDROcodone-acetaminophen (NORCO) 5-325 MG Tab per tablet; Take 1 Tab by mouth every four hours as needed for up to 7 days.  Dispense: 6 Tab; Refill: 0    2. Abscess of face    - amoxicillin-clavulanate (AUGMENTIN) 875-125 MG Tab;  Take 1 Tab by mouth 2 times a day for 7 days.  Dispense: 14 Tab; Refill: 0  - HYDROcodone-acetaminophen (NORCO) 5-325 MG Tab per tablet; Take 1 Tab by mouth every four hours as needed for up to 7 days.  Dispense: 6 Tab; Refill: 0      Educated in proper administration of medication(s) ordered today including safety, possible SE, risks, benefits, rationale and alternatives to therapy.       Warm compresses BID and prn       Return to urgent care clinic or PCP  4-5 days if current symptoms are not resolving in a satisfactory manner or sooner if new or worsening symptoms occur.     Differential diagnosis, natural history, supportive care, and indications for immediate follow-up. Advised of signs and symptoms which would warrant further evaluation and /or emergent evaluation in ER.      Verbalized agreement with this treatment plan and seemed to understand without barriers. Questions were encouraged and answered to satisfaction.     Advised not to drink ETOH, drive car or operate machinery while taking narcotic RX . Verbalizes understanding of safety instructions. Narc Check verified. [place]  Aware web site evaluation: I have obtained and reviewed patient utilization report from [facility] pharmacy database prior to writing prescription for controlled substance II, III or IV per [place] bill . Opioid Risk Tool screen completed.  I believe the benefits of controlled substance therapy outweigh the risks. The reasons for prescribing controlled substances include non-narcotic alternatives have been inadequate for symptom control. Accordingly, I have discussed the risk and benefits, treatment plan, and alternative therapies with the patient.

## 2020-07-29 ENCOUNTER — APPOINTMENT (OUTPATIENT)
Dept: RADIOLOGY | Facility: MEDICAL CENTER | Age: 45
DRG: 603 | End: 2020-07-29
Attending: EMERGENCY MEDICINE
Payer: MEDICAID

## 2020-07-29 ENCOUNTER — HOSPITAL ENCOUNTER (INPATIENT)
Facility: MEDICAL CENTER | Age: 45
LOS: 2 days | DRG: 603 | End: 2020-07-31
Attending: EMERGENCY MEDICINE | Admitting: INTERNAL MEDICINE
Payer: MEDICAID

## 2020-07-29 DIAGNOSIS — L03.211 FACIAL CELLULITIS: ICD-10-CM

## 2020-07-29 PROBLEM — E66.9 OBESITY: Status: ACTIVE | Noted: 2020-07-29

## 2020-07-29 LAB
ALBUMIN SERPL BCP-MCNC: 3.9 G/DL (ref 3.2–4.9)
ALBUMIN/GLOB SERPL: 1.1 G/DL
ALP SERPL-CCNC: 79 U/L (ref 30–99)
ALT SERPL-CCNC: 17 U/L (ref 2–50)
ANION GAP SERPL CALC-SCNC: 10 MMOL/L (ref 7–16)
AST SERPL-CCNC: 14 U/L (ref 12–45)
BASOPHILS # BLD AUTO: 0.5 % (ref 0–1.8)
BASOPHILS # BLD: 0.05 K/UL (ref 0–0.12)
BILIRUB SERPL-MCNC: 0.2 MG/DL (ref 0.1–1.5)
BUN SERPL-MCNC: 6 MG/DL (ref 8–22)
CALCIUM SERPL-MCNC: 9.5 MG/DL (ref 8.4–10.2)
CHLORIDE SERPL-SCNC: 101 MMOL/L (ref 96–112)
CO2 SERPL-SCNC: 27 MMOL/L (ref 20–33)
CREAT SERPL-MCNC: 0.57 MG/DL (ref 0.5–1.4)
CRP SERPL HS-MCNC: 2.5 MG/DL (ref 0–0.75)
EOSINOPHIL # BLD AUTO: 0.15 K/UL (ref 0–0.51)
EOSINOPHIL NFR BLD: 1.4 % (ref 0–6.9)
ERYTHROCYTE [DISTWIDTH] IN BLOOD BY AUTOMATED COUNT: 42.7 FL (ref 35.9–50)
ERYTHROCYTE [SEDIMENTATION RATE] IN BLOOD BY WESTERGREN METHOD: 35 MM/HOUR (ref 0–20)
GLOBULIN SER CALC-MCNC: 3.4 G/DL (ref 1.9–3.5)
GLUCOSE SERPL-MCNC: 95 MG/DL (ref 65–99)
HCG SERPL QL: NEGATIVE
HCT VFR BLD AUTO: 41.6 % (ref 37–47)
HGB BLD-MCNC: 13.1 G/DL (ref 12–16)
IMM GRANULOCYTES # BLD AUTO: 0.04 K/UL (ref 0–0.11)
IMM GRANULOCYTES NFR BLD AUTO: 0.4 % (ref 0–0.9)
LACTATE BLD-SCNC: 1.3 MMOL/L (ref 0.5–2)
LACTATE BLD-SCNC: 2.4 MMOL/L (ref 0.5–2)
LACTATE BLD-SCNC: 2.5 MMOL/L (ref 0.5–2)
LYMPHOCYTES # BLD AUTO: 2.97 K/UL (ref 1–4.8)
LYMPHOCYTES NFR BLD: 28.2 % (ref 22–41)
MAGNESIUM SERPL-MCNC: 2 MG/DL (ref 1.5–2.5)
MCH RBC QN AUTO: 26.7 PG (ref 27–33)
MCHC RBC AUTO-ENTMCNC: 31.5 G/DL (ref 33.6–35)
MCV RBC AUTO: 84.7 FL (ref 81.4–97.8)
MONOCYTES # BLD AUTO: 0.81 K/UL (ref 0–0.85)
MONOCYTES NFR BLD AUTO: 7.7 % (ref 0–13.4)
MRSA DNA SPEC QL NAA+PROBE: ABNORMAL
NEUTROPHILS # BLD AUTO: 6.51 K/UL (ref 2–7.15)
NEUTROPHILS NFR BLD: 61.8 % (ref 44–72)
NRBC # BLD AUTO: 0 K/UL
NRBC BLD-RTO: 0 /100 WBC
PLATELET # BLD AUTO: 274 K/UL (ref 164–446)
PMV BLD AUTO: 11.3 FL (ref 9–12.9)
POTASSIUM SERPL-SCNC: 4.2 MMOL/L (ref 3.6–5.5)
PROT SERPL-MCNC: 7.3 G/DL (ref 6–8.2)
RBC # BLD AUTO: 4.91 M/UL (ref 4.2–5.4)
SIGNIFICANT IND 70042: ABNORMAL
SITE SITE: ABNORMAL
SODIUM SERPL-SCNC: 138 MMOL/L (ref 135–145)
SOURCE SOURCE: ABNORMAL
WBC # BLD AUTO: 10.5 K/UL (ref 4.8–10.8)

## 2020-07-29 PROCEDURE — 36415 COLL VENOUS BLD VENIPUNCTURE: CPT

## 2020-07-29 PROCEDURE — 770001 HCHG ROOM/CARE - MED/SURG/GYN PRIV*

## 2020-07-29 PROCEDURE — 85652 RBC SED RATE AUTOMATED: CPT

## 2020-07-29 PROCEDURE — 99285 EMERGENCY DEPT VISIT HI MDM: CPT

## 2020-07-29 PROCEDURE — 96367 TX/PROPH/DG ADDL SEQ IV INF: CPT

## 2020-07-29 PROCEDURE — A9270 NON-COVERED ITEM OR SERVICE: HCPCS | Performed by: INTERNAL MEDICINE

## 2020-07-29 PROCEDURE — 700101 HCHG RX REV CODE 250: Performed by: EMERGENCY MEDICINE

## 2020-07-29 PROCEDURE — 80053 COMPREHEN METABOLIC PANEL: CPT

## 2020-07-29 PROCEDURE — 0J913ZX DRAINAGE OF FACE SUBCUTANEOUS TISSUE AND FASCIA, PERCUTANEOUS APPROACH, DIAGNOSTIC: ICD-10-PCS | Performed by: EMERGENCY MEDICINE

## 2020-07-29 PROCEDURE — 700111 HCHG RX REV CODE 636 W/ 250 OVERRIDE (IP): Performed by: EMERGENCY MEDICINE

## 2020-07-29 PROCEDURE — 96366 THER/PROPH/DIAG IV INF ADDON: CPT

## 2020-07-29 PROCEDURE — 94760 N-INVAS EAR/PLS OXIMETRY 1: CPT

## 2020-07-29 PROCEDURE — 85025 COMPLETE CBC W/AUTO DIFF WBC: CPT

## 2020-07-29 PROCEDURE — 700111 HCHG RX REV CODE 636 W/ 250 OVERRIDE (IP): Performed by: INTERNAL MEDICINE

## 2020-07-29 PROCEDURE — 700117 HCHG RX CONTRAST REV CODE 255: Performed by: EMERGENCY MEDICINE

## 2020-07-29 PROCEDURE — 96376 TX/PRO/DX INJ SAME DRUG ADON: CPT

## 2020-07-29 PROCEDURE — 96375 TX/PRO/DX INJ NEW DRUG ADDON: CPT

## 2020-07-29 PROCEDURE — 84703 CHORIONIC GONADOTROPIN ASSAY: CPT

## 2020-07-29 PROCEDURE — 700105 HCHG RX REV CODE 258: Performed by: INTERNAL MEDICINE

## 2020-07-29 PROCEDURE — 70487 CT MAXILLOFACIAL W/DYE: CPT

## 2020-07-29 PROCEDURE — 96368 THER/DIAG CONCURRENT INF: CPT

## 2020-07-29 PROCEDURE — 86140 C-REACTIVE PROTEIN: CPT

## 2020-07-29 PROCEDURE — 99223 1ST HOSP IP/OBS HIGH 75: CPT | Performed by: INTERNAL MEDICINE

## 2020-07-29 PROCEDURE — 700111 HCHG RX REV CODE 636 W/ 250 OVERRIDE (IP)

## 2020-07-29 PROCEDURE — C9803 HOPD COVID-19 SPEC COLLECT: HCPCS | Performed by: INTERNAL MEDICINE

## 2020-07-29 PROCEDURE — 700105 HCHG RX REV CODE 258: Performed by: EMERGENCY MEDICINE

## 2020-07-29 PROCEDURE — 83605 ASSAY OF LACTIC ACID: CPT

## 2020-07-29 PROCEDURE — 96365 THER/PROPH/DIAG IV INF INIT: CPT

## 2020-07-29 PROCEDURE — 83735 ASSAY OF MAGNESIUM: CPT

## 2020-07-29 PROCEDURE — 87641 MR-STAPH DNA AMP PROBE: CPT

## 2020-07-29 PROCEDURE — 700102 HCHG RX REV CODE 250 W/ 637 OVERRIDE(OP): Performed by: INTERNAL MEDICINE

## 2020-07-29 PROCEDURE — 10160 PNXR ASPIR ABSC HMTMA BULLA: CPT

## 2020-07-29 PROCEDURE — 87040 BLOOD CULTURE FOR BACTERIA: CPT | Mod: 91

## 2020-07-29 RX ORDER — MORPHINE SULFATE 4 MG/ML
2 INJECTION, SOLUTION INTRAMUSCULAR; INTRAVENOUS
Status: DISCONTINUED | OUTPATIENT
Start: 2020-07-29 | End: 2020-07-31 | Stop reason: HOSPADM

## 2020-07-29 RX ORDER — ACETAMINOPHEN 325 MG/1
650 TABLET ORAL EVERY 6 HOURS PRN
Status: DISCONTINUED | OUTPATIENT
Start: 2020-07-29 | End: 2020-07-31 | Stop reason: HOSPADM

## 2020-07-29 RX ORDER — LIDOCAINE HYDROCHLORIDE AND EPINEPHRINE BITARTRATE 20; .01 MG/ML; MG/ML
10 INJECTION, SOLUTION SUBCUTANEOUS ONCE
Status: COMPLETED | OUTPATIENT
Start: 2020-07-29 | End: 2020-07-29

## 2020-07-29 RX ORDER — ONDANSETRON 4 MG/1
4 TABLET, ORALLY DISINTEGRATING ORAL EVERY 4 HOURS PRN
Status: DISCONTINUED | OUTPATIENT
Start: 2020-07-29 | End: 2020-07-31 | Stop reason: HOSPADM

## 2020-07-29 RX ORDER — OXYCODONE HYDROCHLORIDE 5 MG/1
2.5 TABLET ORAL
Status: DISCONTINUED | OUTPATIENT
Start: 2020-07-29 | End: 2020-07-31 | Stop reason: HOSPADM

## 2020-07-29 RX ORDER — MORPHINE SULFATE 4 MG/ML
4 INJECTION, SOLUTION INTRAMUSCULAR; INTRAVENOUS ONCE
Status: COMPLETED | OUTPATIENT
Start: 2020-07-29 | End: 2020-07-29

## 2020-07-29 RX ORDER — PROMETHAZINE HYDROCHLORIDE 25 MG/1
12.5-25 TABLET ORAL EVERY 4 HOURS PRN
Status: DISCONTINUED | OUTPATIENT
Start: 2020-07-29 | End: 2020-07-31 | Stop reason: HOSPADM

## 2020-07-29 RX ORDER — ONDANSETRON 2 MG/ML
4 INJECTION INTRAMUSCULAR; INTRAVENOUS EVERY 4 HOURS PRN
Status: DISCONTINUED | OUTPATIENT
Start: 2020-07-29 | End: 2020-07-31 | Stop reason: HOSPADM

## 2020-07-29 RX ORDER — KETOROLAC TROMETHAMINE 30 MG/ML
15 INJECTION, SOLUTION INTRAMUSCULAR; INTRAVENOUS EVERY 6 HOURS PRN
Status: DISCONTINUED | OUTPATIENT
Start: 2020-07-29 | End: 2020-07-31 | Stop reason: HOSPADM

## 2020-07-29 RX ORDER — PROMETHAZINE HYDROCHLORIDE 25 MG/1
12.5-25 SUPPOSITORY RECTAL EVERY 4 HOURS PRN
Status: DISCONTINUED | OUTPATIENT
Start: 2020-07-29 | End: 2020-07-31 | Stop reason: HOSPADM

## 2020-07-29 RX ORDER — DIPHENHYDRAMINE HYDROCHLORIDE 50 MG/ML
25 INJECTION INTRAMUSCULAR; INTRAVENOUS ONCE
Status: COMPLETED | OUTPATIENT
Start: 2020-07-29 | End: 2020-07-29

## 2020-07-29 RX ORDER — METHYLPREDNISOLONE SODIUM SUCCINATE 125 MG/2ML
125 INJECTION, POWDER, LYOPHILIZED, FOR SOLUTION INTRAMUSCULAR; INTRAVENOUS ONCE
Status: COMPLETED | OUTPATIENT
Start: 2020-07-29 | End: 2020-07-29

## 2020-07-29 RX ORDER — DIPHENHYDRAMINE HYDROCHLORIDE 50 MG/ML
INJECTION INTRAMUSCULAR; INTRAVENOUS
Status: COMPLETED
Start: 2020-07-29 | End: 2020-07-29

## 2020-07-29 RX ORDER — IBUPROFEN 200 MG
800 TABLET ORAL EVERY 8 HOURS PRN
COMMUNITY
End: 2021-10-30

## 2020-07-29 RX ORDER — POLYETHYLENE GLYCOL 3350 17 G/17G
1 POWDER, FOR SOLUTION ORAL
Status: DISCONTINUED | OUTPATIENT
Start: 2020-07-29 | End: 2020-07-31 | Stop reason: HOSPADM

## 2020-07-29 RX ORDER — PROCHLORPERAZINE EDISYLATE 5 MG/ML
5-10 INJECTION INTRAMUSCULAR; INTRAVENOUS EVERY 4 HOURS PRN
Status: DISCONTINUED | OUTPATIENT
Start: 2020-07-29 | End: 2020-07-31 | Stop reason: HOSPADM

## 2020-07-29 RX ORDER — METHYLPREDNISOLONE SODIUM SUCCINATE 125 MG/2ML
INJECTION, POWDER, LYOPHILIZED, FOR SOLUTION INTRAMUSCULAR; INTRAVENOUS
Status: COMPLETED
Start: 2020-07-29 | End: 2020-07-29

## 2020-07-29 RX ORDER — AMOXICILLIN 250 MG
2 CAPSULE ORAL 2 TIMES DAILY
Status: DISCONTINUED | OUTPATIENT
Start: 2020-07-29 | End: 2020-07-31 | Stop reason: HOSPADM

## 2020-07-29 RX ORDER — SODIUM CHLORIDE, SODIUM LACTATE, POTASSIUM CHLORIDE, CALCIUM CHLORIDE 600; 310; 30; 20 MG/100ML; MG/100ML; MG/100ML; MG/100ML
1000 INJECTION, SOLUTION INTRAVENOUS ONCE
Status: COMPLETED | OUTPATIENT
Start: 2020-07-29 | End: 2020-07-30

## 2020-07-29 RX ORDER — BISACODYL 10 MG
10 SUPPOSITORY, RECTAL RECTAL
Status: DISCONTINUED | OUTPATIENT
Start: 2020-07-29 | End: 2020-07-31 | Stop reason: HOSPADM

## 2020-07-29 RX ORDER — SODIUM CHLORIDE, SODIUM LACTATE, POTASSIUM CHLORIDE, AND CALCIUM CHLORIDE .6; .31; .03; .02 G/100ML; G/100ML; G/100ML; G/100ML
30 INJECTION, SOLUTION INTRAVENOUS
Status: DISCONTINUED | OUTPATIENT
Start: 2020-07-29 | End: 2020-07-29

## 2020-07-29 RX ORDER — LABETALOL HYDROCHLORIDE 5 MG/ML
10 INJECTION, SOLUTION INTRAVENOUS EVERY 4 HOURS PRN
Status: DISCONTINUED | OUTPATIENT
Start: 2020-07-29 | End: 2020-07-31 | Stop reason: HOSPADM

## 2020-07-29 RX ORDER — ENALAPRILAT 1.25 MG/ML
1.25 INJECTION INTRAVENOUS EVERY 6 HOURS PRN
Status: DISCONTINUED | OUTPATIENT
Start: 2020-07-29 | End: 2020-07-31 | Stop reason: HOSPADM

## 2020-07-29 RX ORDER — CLONIDINE HYDROCHLORIDE 0.1 MG/1
0.1 TABLET ORAL EVERY 6 HOURS PRN
Status: DISCONTINUED | OUTPATIENT
Start: 2020-07-29 | End: 2020-07-31 | Stop reason: HOSPADM

## 2020-07-29 RX ORDER — OXYCODONE HYDROCHLORIDE 5 MG/1
5 TABLET ORAL
Status: DISCONTINUED | OUTPATIENT
Start: 2020-07-29 | End: 2020-07-31 | Stop reason: HOSPADM

## 2020-07-29 RX ADMIN — MORPHINE SULFATE 4 MG: 4 INJECTION INTRAVENOUS at 16:00

## 2020-07-29 RX ADMIN — OXYCODONE HYDROCHLORIDE 5 MG: 5 TABLET ORAL at 18:24

## 2020-07-29 RX ADMIN — SENNOSIDES-DOCUSATE SODIUM TAB 8.6-50 MG 2 TABLET: 8.6-5 TAB at 18:24

## 2020-07-29 RX ADMIN — DIPHENHYDRAMINE HYDROCHLORIDE 25 MG: 50 INJECTION INTRAMUSCULAR; INTRAVENOUS at 16:32

## 2020-07-29 RX ADMIN — DIPHENHYDRAMINE HYDROCHLORIDE 25 MG: 50 INJECTION, SOLUTION INTRAMUSCULAR; INTRAVENOUS at 16:32

## 2020-07-29 RX ADMIN — SODIUM CHLORIDE 3 G: 900 INJECTION INTRAVENOUS at 14:05

## 2020-07-29 RX ADMIN — SODIUM CHLORIDE, POTASSIUM CHLORIDE, SODIUM LACTATE AND CALCIUM CHLORIDE 1000 ML: 600; 310; 30; 20 INJECTION, SOLUTION INTRAVENOUS at 15:01

## 2020-07-29 RX ADMIN — METHYLPREDNISOLONE SODIUM SUCCINATE 125 MG: 125 INJECTION, POWDER, LYOPHILIZED, FOR SOLUTION INTRAMUSCULAR; INTRAVENOUS at 16:35

## 2020-07-29 RX ADMIN — METHYLPREDNISOLONE SODIUM SUCCINATE 125 MG: 125 INJECTION, POWDER, FOR SOLUTION INTRAMUSCULAR; INTRAVENOUS at 16:35

## 2020-07-29 RX ADMIN — MORPHINE SULFATE 4 MG: 4 INJECTION INTRAVENOUS at 14:05

## 2020-07-29 RX ADMIN — IOHEXOL 75 ML: 350 INJECTION, SOLUTION INTRAVENOUS at 15:05

## 2020-07-29 RX ADMIN — LIDOCAINE HYDROCHLORIDE,EPINEPHRINE BITARTRATE 10 ML: 20; .01 INJECTION, SOLUTION INFILTRATION; PERINEURAL at 16:01

## 2020-07-29 RX ADMIN — AMPICILLIN SODIUM AND SULBACTAM SODIUM 3 G: 2; 1 INJECTION, POWDER, FOR SOLUTION INTRAMUSCULAR; INTRAVENOUS at 20:54

## 2020-07-29 RX ADMIN — VANCOMYCIN HYDROCHLORIDE 2500 MG: 500 INJECTION, POWDER, LYOPHILIZED, FOR SOLUTION INTRAVENOUS at 15:01

## 2020-07-29 ASSESSMENT — COPD QUESTIONNAIRES
COPD SCREENING SCORE: 2
DURING THE PAST 4 WEEKS HOW MUCH DID YOU FEEL SHORT OF BREATH: NONE/LITTLE OF THE TIME
HAVE YOU SMOKED AT LEAST 100 CIGARETTES IN YOUR ENTIRE LIFE: YES
DO YOU EVER COUGH UP ANY MUCUS OR PHLEGM?: NO/ONLY WITH OCCASIONAL COLDS OR INFECTIONS

## 2020-07-29 ASSESSMENT — FIBROSIS 4 INDEX: FIB4 SCORE: 0.53

## 2020-07-29 NOTE — H&P
Hospital Medicine History & Physical Note    Date of Service  7/29/2020    Primary Care Physician  WAYNE Snow    Code Status  Full Code    Chief Complaint  Chief Complaint   Patient presents with   • Facial Swelling     sat night had a breakout of acne and was given some amoxicillin, pt now presents with swollen right side of face. endorses pressure, pain and drainage.        History of Presenting Illness  45 y.o. female without much past medical history except for history of meth use, and depression, who presented 7/29/2020 with right facial swelling and cellulitis which started 3 days ago.  She states that she had a breakout of acne, and she tried to pop a pimple on the right lower face.  The following morning, she noted that her right face is swollen, and red, with pain rated 8 out of 10 in severity.  She went to an urgent care last Monday, and she was given oral Augmentin which she has taken since.  However her right facial swelling got worse, going up to the upper face, and to the neck.  She also started to have difficulty opening her jaw, causing her to unable to eat solid foods due to the pain.  She denied any dysphagia.  She had no fevers, chills, or any other complaint such as nausea, vomiting, chest pain, shortness of breath, abdominal pain, or bowel movement changes.  She did decided to go to the ED today.    ED course:  The patient was initially evaluated.  Vital signs were stable.  She was not septic.  Initial blood work-up showed no leukocytosis, with normal electrolytes and renal function.  Lactate was normal.  Maxillofacial CT showed inflammation overlying the right mandible and maxilla, with 1.1 x 0.4 cm ill-defined collection within the soft tissues overlying the right mandible which could be phlegmon or small abscess, with minimal periapical lucency surrounding the most posterior right mandibular molar.  She was given IV vancomycin and Unasyn, and updated during her, and was subsequently  admitted to the hospitalist service.    Review of Systems  ROS     Pertinent positives/negatives as mentioned above.     A complete review of systems was personally done by me. All other systems were negative.     Past Medical History   has a past medical history of Allergy, Depression (2004), Headache(784.0), Substance abuse (HCC) (2015), and Urinary tract infection, site not specified.    Surgical History   has a past surgical history that includes tonsillectomy; primary c section; and repeat c section w tubal ligation (N/A, 11/27/2015).     Family History  family history includes Hyperlipidemia in her father and mother; Hypertension in her father.     Social History   reports that she has been smoking cigarettes. She started smoking about 5 years ago. She has a 5.00 pack-year smoking history. She has never used smokeless tobacco. She reports that she does not drink alcohol or use drugs.    Allergies  Allergies   Allergen Reactions   • Tape      Nath skin       Medications  Prior to Admission Medications   Prescriptions Last Dose Informant Patient Reported? Taking?   HYDROcodone-acetaminophen (NORCO) 5-325 MG Tab per tablet 7/29/2020 at 0600 Patient No No   Sig: Take 1 Tab by mouth every four hours as needed for up to 7 days.   amoxicillin-clavulanate (AUGMENTIN) 875-125 MG Tab 7/29/2020 at 1000 Patient No No   Sig: Take 1 Tab by mouth 2 times a day for 7 days.   ibuprofen (MOTRIN) 200 MG Tab 7/28/2020 at PM Patient Yes Yes   Sig: Take 800 mg by mouth every 8 hours as needed for Mild Pain.      Facility-Administered Medications: None       Physical Exam  Temp:  [37.1 °C (98.8 °F)] 37.1 °C (98.8 °F)  Pulse:  [] 68  Resp:  [14-16] 14  BP: (116-136)/(69-74) 136/74  SpO2:  [97 %-98 %] 97 %    Physical Exam  Vitals signs reviewed.   Constitutional:       General: She is not in acute distress.     Appearance: Normal appearance. She is obese. She is not ill-appearing or diaphoretic.      Comments: Body mass index  is 35.11 kg/m².   HENT:      Head: Normocephalic and atraumatic.      Comments: Right facial swelling, with erythema and tenderness.  No fluctuance.  Popped pimple on the corner of the right mouth  (+) trismus     Right Ear: External ear normal.      Left Ear: External ear normal.      Mouth/Throat:      Mouth: Mucous membranes are moist.      Pharynx: No oropharyngeal exudate or posterior oropharyngeal erythema.   Eyes:      General: No scleral icterus.     Extraocular Movements: Extraocular movements intact.      Conjunctiva/sclera: Conjunctivae normal.      Pupils: Pupils are equal, round, and reactive to light.   Neck:      Musculoskeletal: Normal range of motion and neck supple. No neck rigidity or muscular tenderness.   Cardiovascular:      Rate and Rhythm: Normal rate and regular rhythm.      Heart sounds: Normal heart sounds. No murmur.   Pulmonary:      Effort: Pulmonary effort is normal. No respiratory distress.      Breath sounds: Normal breath sounds. No stridor. No wheezing, rhonchi or rales.   Chest:      Chest wall: No tenderness.   Abdominal:      General: Bowel sounds are normal. There is no distension.      Palpations: Abdomen is soft. There is no mass.      Tenderness: There is no abdominal tenderness. There is no guarding or rebound.   Musculoskeletal: Normal range of motion.         General: No swelling.      Right lower leg: No edema.      Left lower leg: No edema.   Lymphadenopathy:      Cervical: No cervical adenopathy.   Skin:     General: Skin is warm and dry.      Coloration: Skin is not jaundiced.      Findings: No rash.   Neurological:      General: No focal deficit present.      Mental Status: She is alert and oriented to person, place, and time. Mental status is at baseline.      Cranial Nerves: No cranial nerve deficit.   Psychiatric:         Mood and Affect: Mood normal.         Behavior: Behavior normal.         Thought Content: Thought content normal.         Judgment: Judgment  normal.         Laboratory:  Recent Labs     07/29/20  1355   WBC 10.5   RBC 4.91   HEMOGLOBIN 13.1   HEMATOCRIT 41.6   MCV 84.7   MCH 26.7*   MCHC 31.5*   RDW 42.7   PLATELETCT 274   MPV 11.3     Recent Labs     07/29/20  1355   SODIUM 138   POTASSIUM 4.2   CHLORIDE 101   CO2 27   GLUCOSE 95   BUN 6*   CREATININE 0.57   CALCIUM 9.5     Recent Labs     07/29/20  1355   ALTSGPT 17   ASTSGOT 14   ALKPHOSPHAT 79   TBILIRUBIN 0.2   GLUCOSE 95         No results for input(s): NTPROBNP in the last 72 hours.      No results for input(s): TROPONINT in the last 72 hours.    Imaging:  CT-MAXILLOFACIAL WITH PLUS RECONS   Final Result      Inflammation overlying the right mandible and maxilla with an ill-defined collection within the soft tissues overlying the right mandible measuring 1.1 x 0.4 cm which may represent a phlegmon/small abscess.      Minimal periapical lucency surrounding the most posterior right mandibular molar.      Enlarged cervical, submandibular and submental lymph nodes are likely reactive.      Mucous retention cyst within the right maxillary sinus.            Assessment/Plan:  I anticipate this patient will require at least two midnights for appropriate medical management, necessitating inpatient admission.    Facial cellulitis- (present on admission)  Assessment & Plan  -Failed outpatient antibiotics.  Due to popping a pimple.  Has very small phlegmon/abscess which will be attempted to be drained by the ER physician.  -I will place her on antibiotics with IV Unasyn, and vancomycin.  Check ESR, CRP, and trend inflammatory markers.  I will also send for MRSA nasal swab for screen.  I have asked the ER physician to send for cultures if able to drain the small phlegmon/abscess.  -I will start her on analgesics with IV morphine, IV Toradol, and oral oxycodone.  -Monitor for clinical improvement with antibiotics.  Watch for fevers.  Trend WBC count.    Obesity- (present on admission)  Assessment & Plan  - Body  mass index is 35.11 kg/m²..  - Counseled on weight loss.  - outpatient referral for outpatient weight management.      DVT prophylaxis: Lovenox SQ

## 2020-07-29 NOTE — ED NOTES
Med Rec complete per Pt at bedside  Allergies Reviewed    Pt started a 7 day course of AUGMENTIN on 7/27.

## 2020-07-29 NOTE — ASSESSMENT & PLAN NOTE
She failed outpatient antibiotics.  Due to popping a pimple.  Has very small phlegmon/abscess which will be attempted to be drained by the ER physician.  Continue broad-spectrum antibiotic with IV Unasyn and vancomycin.  Vancomycin dose adjustment as per vancomycin trough levels.  White blood count stable.  I ordered repeat lactic acid lactic acid trended down and current lactic acid is 2.2  Continue to provider her pain control monitor for adverse effect of narcotic pain medications.  I personally discussed case with infectious disease Dr. Ramos regarding her current medical condition.  Continue antibiotic due to significant swelling and still slightly elevated lactic acid.

## 2020-07-29 NOTE — ED TRIAGE NOTES
"Bib self for above complaints.     Chief Complaint   Patient presents with   • Facial Swelling     sat night had a breakout of acne and was given some amoxicillin, pt now presents with swollen right side of face. endorses pressure, pain and drainage.      /71   Pulse (!) 109   Temp 37.1 °C (98.8 °F) (Temporal)   Resp 16   Ht 1.651 m (5' 5\")   Wt 95.7 kg (210 lb 15.7 oz)   LMP 07/09/2020 (Approximate)   SpO2 97%   Breastfeeding No   BMI 35.11 kg/m²     "

## 2020-07-29 NOTE — ED NOTES
IV established with 5 sticks. Blood to lab. Medicated for pain and fluids and antibiotics infusing.

## 2020-07-29 NOTE — ED PROVIDER NOTES
ED Provider Note    CHIEF COMPLAINT  Chief Complaint   Patient presents with   • Facial Swelling     sat night had a breakout of acne and was given some amoxicillin, pt now presents with swollen right side of face. endorses pressure, pain and drainage.        HPI  Amalia Gonzalez is a 45 y.o. female who presents complaining of facial swelling.  She had a breakout of acne on Saturday.  This is not unusual for her as she has a at the end of her cycles.  She tried squeezing it on Sunday, since that time is gotten progressively worse.  She went to the urgent care on Monday was prescribed Augmentin.  Her symptoms have continued to progress.  Feels like she has soreness across her entire face across the bottom, her lip, also goes towards her lower mandible.  She denies any difficulty breathing or swallowing.  She does not think she is had a fever.  She denies any fever or chills to her knowledge.  No cough or cold symptoms.  She does have a sebaceous cyst to the top of her right scalp at the vertex which is also oozing.  Denies any rashes.  No belly pain.  No change in bowel bladder.  There is no tooth pain.  There is no other complaint.    PAST MEDICAL HISTORY  Past Medical History:   Diagnosis Date   • Allergy    • Depression 2004    Pt. states was put on Lexapro   • Headache(784.0)    • Substance abuse (HCC) 2015    meth pt. states stopped with pregnancy.   • Urinary tract infection, site not specified        FAMILY HISTORY  Family History   Problem Relation Age of Onset   • Hyperlipidemia Mother    • Hypertension Father    • Hyperlipidemia Father        SOCIAL HISTORY  Social History     Tobacco Use   • Smoking status: Current Every Day Smoker     Packs/day: 0.25     Years: 20.00     Pack years: 5.00     Types: Cigarettes     Start date: 2/1/2015   • Smokeless tobacco: Never Used   • Tobacco comment: 5-10 cigs/day   Substance Use Topics   • Alcohol use: No     Comment: occ   • Drug use: No     Types: Methamphetamines  "    Comment: iv meth, last used in 2015         SURGICAL HISTORY  Past Surgical History:   Procedure Laterality Date   • REPEAT C SECTION W TUBAL LIGATION N/A 2015    Procedure: REPEAT C SECTION WITH TUBAL LIGATION;  Surgeon: Bela Bansal M.D.;  Location: LABOR AND DELIVERY;  Service:    • PRIMARY C SECTION         • TONSILLECTOMY         CURRENT MEDICATIONS    I have reviewed the nurses notes and/or the list brought with the patient.    ALLERGIES  Allergies   Allergen Reactions   • Tape      Nath skin       REVIEW OF SYSTEMS  See HPI for further details. Review of systems as above, otherwise all other systems are negative.     PHYSICAL EXAM  VITAL SIGNS: /71   Pulse (!) 109   Temp 37.1 °C (98.8 °F) (Temporal)   Resp 16   Ht 1.651 m (5' 5\")   Wt 95.7 kg (210 lb 15.7 oz)   LMP 2020 (Approximate)   SpO2 97%   Breastfeeding No   BMI 35.11 kg/m²     Constitutional: Well appearing patient in no acute distress.  Not toxic, nor ill in appearance.  HENT: Mucus membranes moist.  Oropharynx is clear.  At the vertex there is a small, perhaps 4 mm circular lesion which is spontaneously draining caseous appearing material consistent with a sebaceous cyst.  Her right face she has quite a bit of edema over the mandible to the right of center.  It does appear to be coming to a point medially.  She has tenderness in the submental space however this is soft and non-brawny.  The floor the mouth is normal.  The tongue is normal.  Posterior pharynx normal.  The teeth are unremarkable.  Eyes: Pupils equally round.  No scleral icterus.   Neck: Full nontender range of motion.  Lymphatic: There is shotty cervical lymphadenopathy noted.   Cardiovascular: Mildly tachycardic.  No murmurs, rubs, nor gallop appreciated.   Thorax & Lungs: Chest is nontender.  Lungs are clear to auscultation with good air movement bilaterally.  No wheeze, rhonchi, nor rales.   Abdomen: Soft, with no tenderness, rebound " "nor guarding.  No mass, pulsatile mass, nor hepatosplenomegaly appreciated.  Skin: No purpura nor petechia noted.  As above.  Extremities/Musculoskeletal: No sign of trauma.    Neurologic: Alert & oriented.  Strength and sensation is intact all around.  Gait is normal.  Psychiatric: Normal affect appropriate for the clinical situation.    LABS  Labs Reviewed   CBC WITH DIFFERENTIAL - Abnormal; Notable for the following components:       Result Value    MCH 26.7 (*)     MCHC 31.5 (*)     All other components within normal limits   COMP METABOLIC PANEL - Abnormal; Notable for the following components:    Bun 6 (*)     All other components within normal limits   BLOOD CULTURE    Narrative:     Per Hospital Policy: Only change Specimen Src: to \"Line\" if  specified by physician order.   BLOOD CULTURE    Narrative:     Per Hospital Policy: Only change Specimen Src: to \"Line\" if  specified by physician order.   HCG QUAL SERUM   LACTIC ACID   ESTIMATED GFR   COVID/SARS COV-2   MRSA BY PCR (AMP)   LACTIC ACID   LACTIC ACID   SED RATE   CRP QUANTITIVE (NON-CARDIAC)   MAGNESIUM         RADIOLOGY/PROCEDURES  I have reviewed the patient's film interpretations myself, and they are read out by the radiologist as:   CT-MAXILLOFACIAL WITH PLUS RECONS   Final Result      Inflammation overlying the right mandible and maxilla with an ill-defined collection within the soft tissues overlying the right mandible measuring 1.1 x 0.4 cm which may represent a phlegmon/small abscess.      Minimal periapical lucency surrounding the most posterior right mandibular molar.      Enlarged cervical, submandibular and submental lymph nodes are likely reactive.      Mucous retention cyst within the right maxillary sinus.        .    Procedure note  I have prepped the patient's face with Betadine.  Area of maximum induration was anesthetized with a total of 1 cc of 1% lidocaine after prepping and draping.  I was able to express a small amount of " purulence from the area was pointing, and then I went ahead and also aspirated through the central area of induration with an 18-gauge needle.  Able to obtain a small amount of purulent debris from the expression, none from the aspiration.  This was sent for culture.    COURSE & MEDICAL DECISION MAKING  I have reviewed any medical record information, laboratory studies and radiographic results as noted above.  Patient presents with facial swelling in the setting of what sounds to be acne or a pimple.  This appears to have gotten infected.  She has subsequently been failing outpatient antibiotics.  Because of the severity of her symptoms, that she should be admitted to the hospital.  Written for IV antibiotics.  Initial concern about the possibly sepsis given her tachycardia.  However this tachycardia does resolve and her first lactic acid is normal.  She was given Unasyn and vancomycin.  We did go ahead and CT her, this because the above results.  There is a question whether this is an abscess versus phlegmon.  As noted above, I did express a little bit of purulence from the area that was already spontaneously draining, and then I went lateral this as well.  At this point, I do not think she would benefit from incision.  After receiving a second dose of morphine.  The patient did start getting very itchy, her skin was somewhat blotchy.  I suspect that given the timing this is from a histamine release from the morphine.  Delayed reaction to the vancomycin that she is getting as possible, as is delayed reaction to her contrast.  However, there is no respiratory symptoms, do not think she needs epinephrine.  We gave her a dose of Solu-Medrol and Benadryl intravenously and she is feeling significantly improved.  Case was discussed with Dr. Bangura, he is admitting her.      FINAL IMPRESSION  1. Facial cellulitis    2.  Pruritic rash suspect histamine release, allergic reaction felt to be less likely  3.  Failing outpatient  antibiotics       This dictation was created using voice recognition software.    Electronically signed by: Sebas Patrick M.D., 7/29/2020 12:55 PM

## 2020-07-30 LAB
ANION GAP SERPL CALC-SCNC: 12 MMOL/L (ref 7–16)
BASOPHILS # BLD AUTO: 0.1 % (ref 0–1.8)
BASOPHILS # BLD: 0.01 K/UL (ref 0–0.12)
BUN SERPL-MCNC: 7 MG/DL (ref 8–22)
CALCIUM SERPL-MCNC: 9.1 MG/DL (ref 8.4–10.2)
CHLORIDE SERPL-SCNC: 102 MMOL/L (ref 96–112)
CO2 SERPL-SCNC: 20 MMOL/L (ref 20–33)
COVID ORDER STATUS COVID19: NORMAL
COVID ORDER STATUS COVID19: NORMAL
CREAT SERPL-MCNC: 0.53 MG/DL (ref 0.5–1.4)
CRP SERPL HS-MCNC: 3.09 MG/DL (ref 0–0.75)
EOSINOPHIL # BLD AUTO: 0.01 K/UL (ref 0–0.51)
EOSINOPHIL NFR BLD: 0.1 % (ref 0–6.9)
ERYTHROCYTE [DISTWIDTH] IN BLOOD BY AUTOMATED COUNT: 41.2 FL (ref 35.9–50)
GLUCOSE SERPL-MCNC: 145 MG/DL (ref 65–99)
HCT VFR BLD AUTO: 40.1 % (ref 37–47)
HGB BLD-MCNC: 13 G/DL (ref 12–16)
IMM GRANULOCYTES # BLD AUTO: 0.05 K/UL (ref 0–0.11)
IMM GRANULOCYTES NFR BLD AUTO: 0.5 % (ref 0–0.9)
LACTATE BLD-SCNC: 2.2 MMOL/L (ref 0.5–2)
LYMPHOCYTES # BLD AUTO: 1.11 K/UL (ref 1–4.8)
LYMPHOCYTES NFR BLD: 11 % (ref 22–41)
MCH RBC QN AUTO: 26.8 PG (ref 27–33)
MCHC RBC AUTO-ENTMCNC: 32.4 G/DL (ref 33.6–35)
MCV RBC AUTO: 82.7 FL (ref 81.4–97.8)
MONOCYTES # BLD AUTO: 0.09 K/UL (ref 0–0.85)
MONOCYTES NFR BLD AUTO: 0.9 % (ref 0–13.4)
NEUTROPHILS # BLD AUTO: 8.81 K/UL (ref 2–7.15)
NEUTROPHILS NFR BLD: 87.4 % (ref 44–72)
NRBC # BLD AUTO: 0 K/UL
NRBC BLD-RTO: 0 /100 WBC
PLATELET # BLD AUTO: 292 K/UL (ref 164–446)
PMV BLD AUTO: 11.3 FL (ref 9–12.9)
POTASSIUM SERPL-SCNC: 4.4 MMOL/L (ref 3.6–5.5)
RBC # BLD AUTO: 4.85 M/UL (ref 4.2–5.4)
SODIUM SERPL-SCNC: 134 MMOL/L (ref 135–145)
VANCOMYCIN TROUGH SERPL-MCNC: 11.8 UG/ML (ref 10–20)
WBC # BLD AUTO: 10.1 K/UL (ref 4.8–10.8)

## 2020-07-30 PROCEDURE — 700111 HCHG RX REV CODE 636 W/ 250 OVERRIDE (IP): Performed by: INTERNAL MEDICINE

## 2020-07-30 PROCEDURE — 99233 SBSQ HOSP IP/OBS HIGH 50: CPT | Performed by: INTERNAL MEDICINE

## 2020-07-30 PROCEDURE — 700102 HCHG RX REV CODE 250 W/ 637 OVERRIDE(OP): Performed by: INTERNAL MEDICINE

## 2020-07-30 PROCEDURE — 80048 BASIC METABOLIC PNL TOTAL CA: CPT

## 2020-07-30 PROCEDURE — U0003 INFECTIOUS AGENT DETECTION BY NUCLEIC ACID (DNA OR RNA); SEVERE ACUTE RESPIRATORY SYNDROME CORONAVIRUS 2 (SARS-COV-2) (CORONAVIRUS DISEASE [COVID-19]), AMPLIFIED PROBE TECHNIQUE, MAKING USE OF HIGH THROUGHPUT TECHNOLOGIES AS DESCRIBED BY CMS-2020-01-R: HCPCS

## 2020-07-30 PROCEDURE — 86140 C-REACTIVE PROTEIN: CPT

## 2020-07-30 PROCEDURE — 770001 HCHG ROOM/CARE - MED/SURG/GYN PRIV*

## 2020-07-30 PROCEDURE — A9270 NON-COVERED ITEM OR SERVICE: HCPCS | Performed by: INTERNAL MEDICINE

## 2020-07-30 PROCEDURE — 83605 ASSAY OF LACTIC ACID: CPT

## 2020-07-30 PROCEDURE — 36415 COLL VENOUS BLD VENIPUNCTURE: CPT

## 2020-07-30 PROCEDURE — 85025 COMPLETE CBC W/AUTO DIFF WBC: CPT

## 2020-07-30 PROCEDURE — 700105 HCHG RX REV CODE 258: Performed by: INTERNAL MEDICINE

## 2020-07-30 PROCEDURE — 99255 IP/OBS CONSLTJ NEW/EST HI 80: CPT | Performed by: INTERNAL MEDICINE

## 2020-07-30 PROCEDURE — 80202 ASSAY OF VANCOMYCIN: CPT

## 2020-07-30 RX ADMIN — AMPICILLIN SODIUM AND SULBACTAM SODIUM 3 G: 2; 1 INJECTION, POWDER, FOR SOLUTION INTRAMUSCULAR; INTRAVENOUS at 01:00

## 2020-07-30 RX ADMIN — AMPICILLIN SODIUM AND SULBACTAM SODIUM 3 G: 2; 1 INJECTION, POWDER, FOR SOLUTION INTRAMUSCULAR; INTRAVENOUS at 14:16

## 2020-07-30 RX ADMIN — AMPICILLIN SODIUM AND SULBACTAM SODIUM 3 G: 2; 1 INJECTION, POWDER, FOR SOLUTION INTRAMUSCULAR; INTRAVENOUS at 18:28

## 2020-07-30 RX ADMIN — VANCOMYCIN HYDROCHLORIDE 1750 MG: 500 INJECTION, POWDER, LYOPHILIZED, FOR SOLUTION INTRAVENOUS at 15:35

## 2020-07-30 RX ADMIN — SENNOSIDES-DOCUSATE SODIUM TAB 8.6-50 MG 2 TABLET: 8.6-5 TAB at 05:03

## 2020-07-30 RX ADMIN — AMPICILLIN SODIUM AND SULBACTAM SODIUM 3 G: 2; 1 INJECTION, POWDER, FOR SOLUTION INTRAMUSCULAR; INTRAVENOUS at 03:10

## 2020-07-30 RX ADMIN — KETOROLAC TROMETHAMINE 15 MG: 30 INJECTION, SOLUTION INTRAMUSCULAR at 08:51

## 2020-07-30 RX ADMIN — VANCOMYCIN HYDROCHLORIDE 1750 MG: 500 INJECTION, POWDER, LYOPHILIZED, FOR SOLUTION INTRAVENOUS at 04:11

## 2020-07-30 RX ADMIN — OXYCODONE HYDROCHLORIDE 5 MG: 5 TABLET ORAL at 18:33

## 2020-07-30 RX ADMIN — AMPICILLIN SODIUM AND SULBACTAM SODIUM 3 G: 2; 1 INJECTION, POWDER, FOR SOLUTION INTRAMUSCULAR; INTRAVENOUS at 08:33

## 2020-07-30 ASSESSMENT — ENCOUNTER SYMPTOMS
FOCAL WEAKNESS: 0
VOMITING: 0
PHOTOPHOBIA: 0
ORTHOPNEA: 0
ABDOMINAL PAIN: 0
SHORTNESS OF BREATH: 0
MYALGIAS: 0
SPEECH CHANGE: 0
BACK PAIN: 0
SPUTUM PRODUCTION: 0
CHILLS: 0
WEIGHT LOSS: 0
NECK PAIN: 0
HEADACHES: 0
CONSTIPATION: 0
TINGLING: 0
FEVER: 0
DIARRHEA: 0
PALPITATIONS: 0
COUGH: 0
DOUBLE VISION: 0
NAUSEA: 0
TREMORS: 0
EYE PAIN: 0
SENSORY CHANGE: 0
HALLUCINATIONS: 0
DIZZINESS: 0
BLURRED VISION: 0

## 2020-07-30 ASSESSMENT — COGNITIVE AND FUNCTIONAL STATUS - GENERAL
DAILY ACTIVITIY SCORE: 24
MOBILITY SCORE: 24
SUGGESTED CMS G CODE MODIFIER MOBILITY: CH
SUGGESTED CMS G CODE MODIFIER DAILY ACTIVITY: CH

## 2020-07-30 ASSESSMENT — LIFESTYLE VARIABLES: SUBSTANCE_ABUSE: 0

## 2020-07-30 NOTE — PROGRESS NOTES
Bedside report received from night RN. Assumed care of patient. Daily plan of care discussed. Pt resting comfortably in bed at this time, wakes easily to voice. Pt denies complaints or concerns at this time. Hourly rounding in place.

## 2020-07-30 NOTE — PROGRESS NOTES
2 RN skin assessment completed. No open wound or pressure sore note or reported except R side face.

## 2020-07-30 NOTE — PROGRESS NOTES
Sol from Lab called with result of positive MRSA at 2215. Lab result read back to Sol. MD paged. MD notified.

## 2020-07-30 NOTE — DISCHARGE PLANNING
Patient is a 45 year old single mother of two children (ages 4, and 14). Patient lives in [place], [place]. Currently her parents are watching her children. 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 Has Medicaid HMO.    Primary medical treatment from [facility]. No other specialists. No issues with ADLs or IADLs. No home O2. [facility]. No ACP, full code. Lior reports no issues with drugs, alcohol, or mental health. However, prior hx of methamphetamine use.     Upon d/c patient will have a family member pick her up.     Per chart review, ID once medically clear, patient can d/c on PO Augmenting and Bactrim.    No needs per care coordination anticipated.     Care Transition Team Assessment    Information Source  Orientation : Oriented x 4  Information Given By: Patient  Who is responsible for making decisions for patient? : Patient    Readmission Evaluation  Is this a readmission?: No    Elopement Risk  Legal Hold: No  Ambulatory or Self Mobile in Wheelchair: Yes  Disoriented: No  Psychiatric Symptoms: None  History of Wandering: No  Elopement this Admit: No  Vocalizing Wanting to Leave: No  Displays Behaviors, Body Language Wanting to Leave: No-Not at Risk for Elopement  Elopement Risk: Not at Risk for Elopement         Discharge Preparedness  What is your plan after discharge?: Home with help  What are your discharge supports?: Child, Parent  Prior Functional Level: Ambulatory, Drives Self, Independent with Activities of Daily Living, Independent with Medication Management  Difficulity with ADLs: None  Difficulity with IADLs: None    Functional Assesment  Prior Functional Level: Ambulatory, Drives Self, Independent with Activities of Daily Living, Independent with Medication Management    Finances  Financial Barriers to Discharge: No  Prescription Coverage: Yes              Advance Directive  Advance Directive?: None    Domestic  Abuse  Have you ever been the victim of abuse or violence?: No    Psychological Assessment  History of Substance Abuse: None  History of Psychiatric Problems: No  Non-compliant with Treatment: No  Newly Diagnosed Illness: Yes    Discharge Risks or Barriers  Discharge risks or barriers?: No    Anticipated Discharge Information  Anticipated discharge disposition: Home  Discharge Address: [address]  Discharge Contact Phone Number: [phone]

## 2020-07-30 NOTE — CARE PLAN
Problem: Communication  Goal: The ability to communicate needs accurately and effectively will improve  Outcome: PROGRESSING AS EXPECTED  Intervention: [place] patient and significant other/support system to call light to alert staff of needs  Flowsheets (Taken 7/30/2020 1626)  Oriented to:: All of the Following : Location of Bathroom, Visiting Policy, Unit Routine, Call Light and Bedside Controls, Bedside Rail Policy, Smoking Policy, Rights and Responsibilities, Bedside Report, and Patient Education Notebook  Note: Environmental fall precautions and hourly rounding in place. Pt instructed to call for assistance as needed. Pt verbalized understanding and calls appropriately.        Problem: Safety  Goal: Will remain free from falls  Outcome: PROGRESSING AS EXPECTED  Intervention: Implement fall precautions  Flowsheets (Taken 7/30/2020 0900)  Environmental Precautions:   Treaded Slipper Socks on Patient   Personal Belongings, Wastebasket, Call Bell etc. in Easy Reach   Report Given to Other Health Care Providers Regarding Fall Risk   Bed in Low Position   Communication Sign for Patients & Families   Mobility Assessed & Appropriate Sign Placed  Note: Environmental fall precautions and hourly rounding in place. Pt instructed to call for assistance as needed before mobilizing. Pt verbalized understanding. Pt observed able to ambulate independently with no assistance required.

## 2020-07-30 NOTE — PROGRESS NOTES
Pt did not request pain medication this shift. Pt's highest pain was 3/10 in R side of face.  Pt's R side of face is red, hot, swollen, and tender.  Pt placed on isolation for positive MRSA result.  Pt ambulated to restroom independently this shift and tolerated it well.

## 2020-07-30 NOTE — CARE PLAN
"  Problem: Safety  Goal: Will remain free from injury  Outcome: PROGRESSING AS EXPECTED  Intervention: Provide assistance with mobility  Flowsheets (Taken 7/30/2020 0038)  Assistance: Standby Assist  Ambulation Tolerance: Tolerates Well  Intervention: Collaborate with Interdisciplinary Team for safe transfer and mobilization techniques  Flowsheets (Taken 7/30/2020 0038)  Assistive Devices: None  Goal: Will remain free from falls  Outcome: PROGRESSING AS EXPECTED  Intervention: Assess risk factors for falls  Flowsheets  Taken 7/30/2020 0038 by Poornima Quinones R.N.  Pt Calls for Assistance: Yes  Taken 7/29/2020 1715 by Cortney Field R.N.  History of fall: 0  Taken 7/30/2020 0259 by Poornima Quinones R.N.  Mobility Status Assessment: 0-Ambulates & Transfers Independently. No Assistance Required  Intervention: Implement fall precautions  Flowsheets (Taken 7/29/2020 2052)  Environmental Precautions: (Pt declined hospital socks due to \"feeling too warm\" per pt)   Personal Belongings, Wastebasket, Call Bell etc. in Easy Reach   Transferred to Stronger Side   Report Given to Other Health Care Providers Regarding Fall Risk   Bed in Low Position   Communication Sign for Patients & Families   Mobility Assessed & Appropriate Sign Placed     Problem: Pain Management  Goal: Pain level will decrease to patient's comfort goal  Outcome: PROGRESSING AS EXPECTED     "

## 2020-07-30 NOTE — DISCHARGE PLANNING
Anticipated Discharge Disposition: Home    Action: SW completed chart review. SW to complete assessment today.     Barriers to Discharge: TBD    Plan: SW to follow up in rounds, and to complete assessment today.

## 2020-07-30 NOTE — CONSULTS
INFECTIOUS DISEASES INPATIENT CONSULT NOTE     Date of Service: 7/30/2020    Consult Requested By: Amber Hurst M.D.    Reason for Consultation: Right sided facial cellulitis    History of Present Illness:   Amalia Gonzalez is a 45 y.o. woman with a history of prior methamphetamine use (quit 8 months ago) admitted 7/29/2020 increasing left side facial erythema and pain. Patient noted a pimple on the left side of her mouth on the Saturday prior to admission. She popped it and the following morning she noted surrounding erythema and pain around the lesion which extended towards her right ear and neck. She denied any fevers or chills. No nausea or vomiting. She had difficulty with opening her mouth with associated pain. As such, she went to urgent care and was prescribed augmentin. She took the abx for 2 days but did not notice any improvement thus presented to the ED for further evaluation and management. On presentation, she was afebrile with a normal white count. CT scan revealed inflammation overlying the right mandible and maxilla with an ill-defined collection within the soft tissues overlying the right mandible measuring 1.1 x 0.4 cm which may represent a phlegmon/small abscess. This phlegmon is too small for drainage. Blood cultures are negative to date. She is currently on vancomycin and unasyn. MRSA nasal swab is positive. Overall, her erythema has improved however she still has tenderness around her chin. ID service consulted for further abx recommendations and mgt.    All other review of systems reviewed and negative except those documented above in the HPI.     PMH:   Past Medical History:   Diagnosis Date   • Allergy    • Depression 2004    Pt. states was put on Lexapro   • Headache(784.0)    • Substance abuse (HCC) 2015    meth pt. states stopped with pregnancy.   • Urinary tract infection, site not specified        PSH:  Past Surgical History:   Procedure Laterality Date   • REPEAT C SECTION W  TUBAL LIGATION N/A 2015    Procedure: REPEAT C SECTION WITH TUBAL LIGATION;  Surgeon: Bela Bansal M.D.;  Location: LABOR AND DELIVERY;  Service:    • PRIMARY C SECTION         • TONSILLECTOMY         FAMILY HX:  Family History   Problem Relation Age of Onset   • Hyperlipidemia Mother    • Hypertension Father    • Hyperlipidemia Father        SOCIAL HX:  Social History     Socioeconomic History   • Marital status: Single     Spouse name: Not on file   • Number of children: Not on file   • Years of education: Not on file   • Highest education level: Not on file   Occupational History   • Not on file   Social Needs   • Financial resource strain: Not on file   • Food insecurity     Worry: Not on file     Inability: Not on file   • Transportation needs     Medical: Not on file     Non-medical: Not on file   Tobacco Use   • Smoking status: Current Every Day Smoker     Packs/day: 0.25     Years: 20.00     Pack years: 5.00     Types: Cigarettes     Start date: 2015   • Smokeless tobacco: Never Used   • Tobacco comment: 5-10 cigs/day   Substance and Sexual Activity   • Alcohol use: No     Comment: occ   • Drug use: No     Types: Methamphetamines     Comment: iv meth, quit 7 months ago   • Sexual activity: Yes     Partners: Male     Birth control/protection: Condom   Lifestyle   • Physical activity     Days per week: Not on file     Minutes per session: Not on file   • Stress: Not on file   Relationships   • Social connections     Talks on phone: Not on file     Gets together: Not on file     Attends [employer] service: Not on file     Active member of club or organization: Not on file     Attends meetings of clubs or organizations: Not on file     Relationship status: Not on file   • Intimate partner violence     Fear of current or ex partner: Not on file     Emotionally abused: Not on file     Physically abused: Not on file     Forced sexual activity: Not on file   Other Topics Concern   • Not on  file   Social History Narrative   • Not on file     Social History     Tobacco Use   Smoking Status Current Every Day Smoker   • Packs/day: 0.25   • Years: 20.00   • Pack years: 5.00   • Types: Cigarettes   • Start date: 2/1/2015   Smokeless Tobacco Never Used   Tobacco Comment    5-10 cigs/day     Social History     Substance and Sexual Activity   Alcohol Use No    Comment: occ       Allergies/Intolerances:  Allergies   Allergen Reactions   • Tape      Nath skin       History reviewed with the patient     Other Current Medications:    Current Facility-Administered Medications:   •  ampicillin/sulbactam (UNASYN) 3 g in  mL IVPB, 3 g, Intravenous, Q6HRS, Josef Moura M.D., Last Rate: 200 mL/hr at 07/30/20 0833, 3 g at 07/30/20 0833  •  MD Alert...Vancomycin per Pharmacy, , Other, PHARMACY TO DOSE, Josef Moura M.D.  •  Respiratory Therapy Consult, , Nebulization, Continuous RT, Josef Moura M.D.  •  acetaminophen (TYLENOL) tablet 650 mg, 650 mg, Oral, Q6HRS PRN, Josef Moura M.D.  •  cloNIDine (CATAPRES) tablet 0.1 mg, 0.1 mg, Oral, Q6HRS PRN, Josef Moura M.D.  •  enalaprilat (VASOTEC) injection 1.25 mg, 1.25 mg, Intravenous, Q6HRS PRN, Josef Moura M.D.  •  labetalol (NORMODYNE/TRANDATE) injection 10 mg, 10 mg, Intravenous, Q4HRS PRN, Josef Moura M.D.  •  ondansetron (ZOFRAN) syringe/vial injection 4 mg, 4 mg, Intravenous, Q4HRS PRN, Josef Moura M.D.  •  ondansetron (ZOFRAN ODT) dispertab 4 mg, 4 mg, Oral, Q4HRS PRN, Josef Moura M.D.  •  promethazine (PHENERGAN) tablet 12.5-25 mg, 12.5-25 mg, Oral, Q4HRS PRN, Josef Moura M.D.  •  promethazine (PHENERGAN) suppository 12.5-25 mg, 12.5-25 mg, Rectal, Q4HRS PRN, Josef Moura M.D.  •  prochlorperazine (COMPAZINE) injection 5-10 mg, 5-10 mg, Intravenous, Q4HRS PRN, Josef Moura M.D.  •  senna-docusate (PERICOLACE or SENOKOT S) 8.6-50 MG per tablet 2 Tab, 2 Tab, Oral, BID, 2 Tab at 07/30/20 0503 **AND** polyethylene  "glycol/lytes (MIRALAX) PACKET 1 Packet, 1 Packet, Oral, QDAY PRN **AND** magnesium hydroxide (MILK OF MAGNESIA) suspension 30 mL, 30 mL, Oral, QDAY PRN **AND** bisacodyl (DULCOLAX) suppository 10 mg, 10 mg, Rectal, QDAY PRN, Josef Moura M.D.  •  enoxaparin (LOVENOX) inj 40 mg, 40 mg, Subcutaneous, DAILY, Josef Moura M.D.  •  Notify provider if pain remains uncontrolled, , , CONTINUOUS **AND** Use the numeric rating scale (NRS-11) on regular floors and Critical-Care Pain Observation Tool (CPOT) on ICUs/Trauma to assess pain, , , CONTINUOUS **AND** Pulse Ox (Oximetry), , , CONTINUOUS **AND** Pharmacy Consult Request ...Pain Management Review 1 Each, 1 Each, Other, PHARMACY TO DOSE **AND** If patient difficult to arouse and/or has respiratory depression, stop any opiates that are currently infusing and call a Rapid Response., , , CONTINUOUS **AND** oxyCODONE immediate-release (ROXICODONE) tablet 2.5 mg, 2.5 mg, Oral, Q3HRS PRN **AND** oxyCODONE immediate-release (ROXICODONE) tablet 5 mg, 5 mg, Oral, Q3HRS PRN, 5 mg at 20 1824 **AND** morphine (pf) 4 MG/ML injection 2 mg, 2 mg, Intravenous, Q3HRS PRN, Josef Moura M.D.  •  ketorolac (TORADOL) injection 15 mg, 15 mg, Intravenous, Q6HRS PRN, Josef Moura M.D., 15 mg at 20 0851  •  vancomycin (VANCOCIN) 1,750 mg in  mL IVPB, 17 mg/kg, Intravenous, Q12HR, Josef Moura M.D., Stopped at 20 0611  [unfilled]    Most Recent Vital Signs:  /72   Pulse 90   Temp 36.6 °C (97.9 °F) (Oral)   Resp 18   Ht 1.651 m (5' 5\")   Wt 96.4 kg (212 lb 8.4 oz)   LMP 2020 (Approximate)   SpO2 90%   Breastfeeding No   BMI 35.37 kg/m²   Temp  Av.9 °C (98.5 °F)  Min: 36.6 °C (97.9 °F)  Max: 37.1 °C (98.8 °F)    Physical Exam:  General: well nourished, no diaphoresis, well-appearing, no acute distress  HEENT: sclera anicteric, PERRL, extraocular muscles intact, mucous membranes moist, oropharynx clear and moist, no oral " lesions or exudate. Few pustules on the right side of mouth. There is no surrounding erythema but there is surrounding edema. Area is tender to palpation.  Neck: supple, no lymphadenopathy  Chest: CTAB, no rales, rhonchi or wheezes, normal work of breathing.  Cardiac: regular rate and rhythm, normal S1 S2, no murmurs, rubs or gallops  Abdomen: + bowel sounds, soft, non-tender, non-distended, no hepatosplenomegaly  Extremities: WWP, no edema, 2+ pedal pulses  Skin: warm and dry, no rashes or worrisome lesions  Neuro: Alert and oriented times 3, non-focal exam, speech fluent, full range of motion to bilateral upper and lower extremities  Psych: normal mood and behavior, pleasant; memory intact, normal judgement    Pertinent Lab Results:  Recent Labs     07/29/20  1355 07/30/20  0401   WBC 10.5 10.1      Recent Labs     07/29/20  1355 07/30/20  0401   HEMOGLOBIN 13.1 13.0   HEMATOCRIT 41.6 40.1   MCV 84.7 82.7   MCH 26.7* 26.8*   PLATELETCT 274 292         Recent Labs     07/29/20  1355 07/30/20  0401   SODIUM 138 134*   POTASSIUM 4.2 4.4   CHLORIDE 101 102   CO2 27 20   CREATININE 0.57 0.53        Recent Labs     07/29/20  1355   ALBUMIN 3.9        Pertinent Micro:  Results     Procedure Component Value Units Date/Time    BLOOD CULTURE [[id number]] Collected:  07/29/20 1355    Order Status:  Completed Specimen:  Blood from Peripheral Updated:  07/30/20 0816     Significant Indicator NEG     Source BLD     Site PERIPHERAL     Culture Result No Growth  Note: Blood cultures are incubated for 5 days and  are monitored continuously.Positive blood cultures  are called to the RN and reported as soon as  they are identified.  Blood culture testing and Gram stain, if indicated, are  performed at [facility], [address] [place] [address].  Positive blood cultures are  sent to [facility], [address] [place] [address], for organism identification and  susceptibility testing.    "   Narrative:       Per Hospital Policy: Only change Specimen Src: to \"Line\" if  specified by physician order.  Left AC    BLOOD CULTURE [[id number]] Collected:  07/29/20 1312    Order Status:  Completed Specimen:  Blood from Peripheral Updated:  07/30/20 0816     Significant Indicator NEG     Source BLD     Site PERIPHERAL     Culture Result No Growth  Note: Blood cultures are incubated for 5 days and  are monitored continuously.Positive blood cultures  are called to the RN and reported as soon as  they are identified.  Blood culture testing and Gram stain, if indicated, are  performed at [address].  Positive blood cultures are  sent to [facility], [address] [place] [address], for organism identification and  susceptibility testing.      Narrative:       Per Hospital Policy: Only change Specimen Src: to \"Line\" if  specified by physician order.  Left AC    MRSA By PCR (Amp) [[id number]]  (Abnormal) Collected:  07/29/20 1645    Order Status:  Completed Specimen:  Respirate from Nares Updated:  07/29/20 2213     Significant Indicator POS     Source RESP     Site NARES     MRSA PCR POSITIVE for MRSA by PCR.    Narrative:       CALL  [facility] tel. [phone],    CULTURE WOUND W/ GRAM STAIN [[id number]]     Order Status:  Sent Specimen:  Wound from Exudate     Routine (COVID/SARS COV-2 In-House PCR up to 24 hours) [[id number]]     Order Status:  Completed Specimen:  Respirate from Nasopharyngeal         No results found for: BLOODCULTU, BLDCULT, BCHOLD     Studies:  Ct-maxillofacial With Plus Recons    Result Date: 7/29/2020 7/29/2020 2:35 PM HISTORY/REASON FOR EXAM:  right facial cellulitis, likely abscess, failing outpatient Rx. TECHNIQUE/EXAM DESCRIPTION AND NUMBER OF VIEWS:  CT scan of the maxillofacial with contrast, with reconstructions. Thin-section helical imaging was obtained of the maxillofacial structures from the orbital " roofs through the mandible. Coronal and sagittal multiplanar volume reformat images were generated from the axial data., 75 mL of Omnipaque 350 nonionic contrast was injected intravenously. Low dose optimization technique was utilized for this CT exam including automated exposure control and adjustment of the mA and/or kV according to patient size. COMPARISON:  None. FINDINGS: There is a mucous retention cyst within the right maxillary sinus. Remaining visualized paranasal sinuses are clear. The walls of the paranasal sinuses are intact. Lamina papyracea is intact. Orbital rims are maintained. No nasal bone fracture is seen. Nasal spine of the maxilla is intact. Maxilla is intact. Pterygoid plates are intact. Zygomatic arches are maintained. Temporomandibular joints are maintained. Visualized mastoid air cells are clear. There are degenerative changes in the cervical spine. Globes are intact. Lacrimal glands are symmetric. No retro-orbital inflammation is seen. There is inflammation along the right maxilla and mandible. No drainable abscess is identified. There is a small ill-defined collection within the soft tissues measuring 1.1 x 0.4 cm which could represent a small phlegmon/abscess located overlying the right mandible. Right submandibular lymph nodes measure up to 1.2 cm in short axis dimension. Left submandibular lymph nodes measure up to 7 mm in short axis dimension. Submandibular glands are symmetric. Submental lymph nodes measure up to 9 mm in short axis dimension.  Left jugulodigastric lymph nodes measure up to 1.1 cm in short axis dimension. Right jugulodigastric lymph nodes measure up to 0.96 cm in short axis dimension. There are periapical lucencies surrounding the roots of the most posterior right mandibular molar. There is leftward deviation of the nasal septum. Maxillary ostia are patent bilaterally.     Inflammation overlying the right mandible and maxilla with an ill-defined collection within the  soft tissues overlying the right mandible measuring 1.1 x 0.4 cm which may represent a phlegmon/small abscess. Minimal periapical lucency surrounding the most posterior right mandibular molar. Enlarged cervical, submandibular and submental lymph nodes are likely reactive. Mucous retention cyst within the right maxillary sinus.      IMPRESSION:   1. Right sided facial cellulitis    2. History of methamphetamine use    PLAN:   Amalia Gonzalez is a 45 y.o. woman with a history of prior methamphetamine use (quit 8 months PTA) admitted for worsening right sided facial erythema, swelling and pain after popping a zit. Pt found to have cellulitis and a small phlegmon/early abscess on CT scan. She is clinically improving on unasyn and vanco.     -Continue vancomycin given positive MRSA nasal swab and unasyn given clinical improvement  - Monitor renal function and vanco trough  -At discharge, can transition to PO augmenting and bactrim to complete a 10 day course total. Stop date 8/7/2020.    No ID clinic follow up needed.      Plan of care discussed with JUDY Hurst M.D.. Will sign off. Please reconsult if needed    Nahomi Ramos M.D.      Please note that this dictation was created using voice recognition software. I have worked with [facility] to optimize the interface.  I have made every reasonable attempt to correct obvious errors, but there may be errors of grammar and possibly content that I did not discover before finalizing the note.

## 2020-07-30 NOTE — PROGRESS NOTES
Report received from ER. Pt arrived via shayna, Debbie, c/o pain to r side of face which is swallow and red. VSS. No n/v or other needs reports at this time. She is Sitting up eating dinner.

## 2020-07-30 NOTE — PROGRESS NOTES
Hospital Medicine Daily Progress Note    Date of Service  7/30/2020    Chief Complaint  45 y.o. female admitted 7/29/2020 with facial swelling    Hospital Course    *45-year-old female without significant past medical history presented to the hospital on July 29, 2020 with complaint of right facial swelling and cellulitis.  She failed outpatient antibiotic therapy.  She underwent CT scan and it showed inflammation overlying the right mandible and maxilla with an ill-defined collection within the soft tissues overlying the right mandible measuring 1.1 x 0.4 cm which may represent a phlegmon/small abscess.  She was started on broad-spectrum antibiotic and abscess was drained by ER physician.*      Interval Problem Update  I evaluated and examined her at the bedside.  She reported her symptom has been improving with the use of IV antibiotics.  I discussed plan of care with her.  Current white blood cell count is 10.1 hemoglobin remained stable.  Found to have sodium of 134, glucose of 145 and a repeat lactic acid is last lactic acid was 2.4.  Repeat lactic acid is 2.2  Continue broad-spectrum antibiotics.  Requested consult with infectious disease Dr. Ramos    Consultants/Specialty  Infectious disease    Code Status  Full code    Disposition  Home when medically stable    Review of Systems  Review of Systems   Constitutional: Negative for chills, fever and weight loss.   HENT: Negative for hearing loss and tinnitus.    Eyes: Negative for blurred vision, double vision, photophobia and pain.   Respiratory: Negative for cough, sputum production and shortness of breath.    Cardiovascular: Negative for chest pain, palpitations, orthopnea and leg swelling.   Gastrointestinal: Negative for abdominal pain, constipation, diarrhea, nausea and vomiting.   Genitourinary: Negative for dysuria, frequency and urgency.   Musculoskeletal: Negative for back pain, joint pain, myalgias and neck pain.   Skin: Positive for rash.    Neurological: Negative for dizziness, tingling, tremors, sensory change, speech change, focal weakness and headaches.   Psychiatric/Behavioral: Negative for hallucinations and substance abuse.   All other systems reviewed and are negative.       Physical Exam  Temp:  [36.6 °C (97.9 °F)-37.1 °C (98.8 °F)] 36.6 °C (97.9 °F)  Pulse:  [] 90  Resp:  [14-22] 18  BP: (111-146)/(59-76) 120/72  SpO2:  [90 %-100 %] 90 %    Physical Exam  Vitals signs reviewed.   Constitutional:       General: She is not in acute distress.     Appearance: She is obese. She is not ill-appearing.   HENT:      Head: Normocephalic and atraumatic.      Nose: No congestion.   Eyes:      General:         Right eye: No discharge.         Left eye: No discharge.      Pupils: Pupils are equal, round, and reactive to light.   Neck:      Musculoskeletal: Normal range of motion. No neck rigidity.   Cardiovascular:      Rate and Rhythm: Normal rate and regular rhythm.      Pulses: Normal pulses.      Heart sounds: Normal heart sounds. No murmur.   Pulmonary:      Effort: Pulmonary effort is normal. No respiratory distress.      Breath sounds: Normal breath sounds. No stridor.   Abdominal:      General: Bowel sounds are normal. There is no distension.      Palpations: Abdomen is soft.      Tenderness: There is no abdominal tenderness.   Musculoskeletal: Normal range of motion.         General: No swelling or tenderness.   Skin:     General: Skin is warm.      Capillary Refill: Capillary refill takes less than 2 seconds.      Coloration: Skin is not jaundiced or pale.      Findings: Lesion (On right side of face) present. No bruising.   Neurological:      General: No focal deficit present.      Mental Status: She is alert and oriented to person, place, and time.      Cranial Nerves: No cranial nerve deficit.   Psychiatric:         Mood and Affect: Mood normal.         Behavior: Behavior normal.         Fluids    Intake/Output Summary (Last 24 hours)  at 7/30/2020 0720  Last data filed at 7/29/2020 1808  Gross per 24 hour   Intake 100 ml   Output --   Net 100 ml       Laboratory  Recent Labs     07/29/20  1355 07/30/20  0401   WBC 10.5 10.1   RBC 4.91 4.85   HEMOGLOBIN 13.1 13.0   HEMATOCRIT 41.6 40.1   MCV 84.7 82.7   MCH 26.7* 26.8*   MCHC 31.5* 32.4*   RDW 42.7 41.2   PLATELETCT 274 292   MPV 11.3 11.3     Recent Labs     07/29/20  1355 07/30/20  0401   SODIUM 138 134*   POTASSIUM 4.2 4.4   CHLORIDE 101 102   CO2 27 20   GLUCOSE 95 145*   BUN 6* 7*   CREATININE 0.57 0.53   CALCIUM 9.5 9.1                   Imaging  CT-MAXILLOFACIAL WITH PLUS RECONS   Final Result      Inflammation overlying the right mandible and maxilla with an ill-defined collection within the soft tissues overlying the right mandible measuring 1.1 x 0.4 cm which may represent a phlegmon/small abscess.      Minimal periapical lucency surrounding the most posterior right mandibular molar.      Enlarged cervical, submandibular and submental lymph nodes are likely reactive.      Mucous retention cyst within the right maxillary sinus.           Assessment/Plan  Facial cellulitis- (present on admission)  Assessment & Plan  She failed outpatient antibiotics.  Due to popping a pimple.  Has very small phlegmon/abscess which will be attempted to be drained by the ER physician.  Continue broad-spectrum antibiotic with IV Unasyn and vancomycin.  Vancomycin dose adjustment as per vancomycin trough levels.  White blood count stable.  I ordered repeat lactic acid lactic acid trended down and current lactic acid is 2.2  Continue to provider her pain control monitor for adverse effect of narcotic pain medications.  I personally discussed case with infectious disease Dr. Ramos regarding her current medical condition.  Continue antibiotic due to significant swelling and still slightly elevated lactic acid.    Obesity- (present on admission)  Assessment & Plan  - Body mass index is 35.11 kg/m²..  Counseling  provided.  Lifestyle modifications.       VTE prophylaxis: Lovenox

## 2020-07-30 NOTE — PROGRESS NOTES
"Pharmacy Kinetics 45 y.o. female on vancomycin day # 1 2020    Currently on Vancomycin 2500 mg iv loading dose given 1501   Provider specified end date: 20    Indication for Treatment: Facial cellulitis    Pertinent history per medical record: Admitted on 2020 for right sided facial swelling, redness, and pain after expressing acne comedone, now with difficulty opening jaw and unable to eat solid food.  Started 7 day course of Augmentin 20 per Urgent Care.  PMH: meth abuse, depression    Other antibiotics: Unasyn 3 gm IV Q6H x 7 days    Allergies: Tape     List concerns for renal function: obesity (BMI 35.11)    Pertinent cultures to date:    wound culture needs to be collected   BC in process    MRSA nares swab if pneumonia is a concern (ordered/positive/negative/n-a): NA    Recent Labs     20  1355   WBC 10.5   NEUTSPOLYS 61.80     Recent Labs     20  1355   BUN 6*   CREATININE 0.57   ALBUMIN 3.9     No results for input(s): VANCOTROUGH, VANCOPEAK, VANCORANDOM in the last 72 hours.No intake or output data in the 24 hours ending 20 1735   /76   Pulse 89   Temp 37.1 °C (98.8 °F) (Temporal)   Resp (!) 22   Ht 1.651 m (5' 5\")   Wt 95.7 kg (210 lb 15.7 oz)   SpO2 99%  Temp (24hrs), Av.1 °C (98.8 °F), Min:37.1 °C (98.8 °F), Max:37.1 °C (98.8 °F)      A/P   1. Vancomycin dose change: 1750 mg IV Q12H to start 12 hours post loading dose  2. Next vancomycin level:  1430  3. Goal trough: 10 - 15 mcg/ml  4. Comments: Plan to check vancomycin trough prior to 2nd dose due to BMI and will adjust per protocol as needed.  Pharmacy to follow per protocol monitoring and de-escalation opportunity.    ROYAL Yo PharmD    "

## 2020-07-31 VITALS
BODY MASS INDEX: 35.41 KG/M2 | OXYGEN SATURATION: 99 % | TEMPERATURE: 98.1 F | DIASTOLIC BLOOD PRESSURE: 70 MMHG | RESPIRATION RATE: 18 BRPM | HEART RATE: 80 BPM | HEIGHT: 65 IN | WEIGHT: 212.52 LBS | SYSTOLIC BLOOD PRESSURE: 132 MMHG

## 2020-07-31 LAB
ANION GAP SERPL CALC-SCNC: 8 MMOL/L (ref 7–16)
BASOPHILS # BLD AUTO: 0.4 % (ref 0–1.8)
BASOPHILS # BLD: 0.04 K/UL (ref 0–0.12)
BUN SERPL-MCNC: 10 MG/DL (ref 8–22)
CALCIUM SERPL-MCNC: 8.5 MG/DL (ref 8.4–10.2)
CHLORIDE SERPL-SCNC: 106 MMOL/L (ref 96–112)
CO2 SERPL-SCNC: 23 MMOL/L (ref 20–33)
CREAT SERPL-MCNC: 0.59 MG/DL (ref 0.5–1.4)
EOSINOPHIL # BLD AUTO: 0.05 K/UL (ref 0–0.51)
EOSINOPHIL NFR BLD: 0.5 % (ref 0–6.9)
ERYTHROCYTE [DISTWIDTH] IN BLOOD BY AUTOMATED COUNT: 42.9 FL (ref 35.9–50)
GLUCOSE SERPL-MCNC: 109 MG/DL (ref 65–99)
HCT VFR BLD AUTO: 35.5 % (ref 37–47)
HGB BLD-MCNC: 11.3 G/DL (ref 12–16)
IMM GRANULOCYTES # BLD AUTO: 0.03 K/UL (ref 0–0.11)
IMM GRANULOCYTES NFR BLD AUTO: 0.3 % (ref 0–0.9)
LACTATE BLD-SCNC: 1.8 MMOL/L (ref 0.5–2)
LYMPHOCYTES # BLD AUTO: 4.03 K/UL (ref 1–4.8)
LYMPHOCYTES NFR BLD: 42.2 % (ref 22–41)
MCH RBC QN AUTO: 26.7 PG (ref 27–33)
MCHC RBC AUTO-ENTMCNC: 31.8 G/DL (ref 33.6–35)
MCV RBC AUTO: 83.9 FL (ref 81.4–97.8)
MONOCYTES # BLD AUTO: 0.56 K/UL (ref 0–0.85)
MONOCYTES NFR BLD AUTO: 5.9 % (ref 0–13.4)
NEUTROPHILS # BLD AUTO: 4.85 K/UL (ref 2–7.15)
NEUTROPHILS NFR BLD: 50.7 % (ref 44–72)
NRBC # BLD AUTO: 0 K/UL
NRBC BLD-RTO: 0 /100 WBC
PLATELET # BLD AUTO: 266 K/UL (ref 164–446)
PMV BLD AUTO: 11.3 FL (ref 9–12.9)
POTASSIUM SERPL-SCNC: 4.1 MMOL/L (ref 3.6–5.5)
RBC # BLD AUTO: 4.23 M/UL (ref 4.2–5.4)
SARS-COV-2 RNA RESP QL NAA+PROBE: NOTDETECTED
SODIUM SERPL-SCNC: 137 MMOL/L (ref 135–145)
SPECIMEN SOURCE: NORMAL
WBC # BLD AUTO: 9.6 K/UL (ref 4.8–10.8)

## 2020-07-31 PROCEDURE — 99239 HOSP IP/OBS DSCHRG MGMT >30: CPT | Performed by: INTERNAL MEDICINE

## 2020-07-31 PROCEDURE — 36415 COLL VENOUS BLD VENIPUNCTURE: CPT

## 2020-07-31 PROCEDURE — 80048 BASIC METABOLIC PNL TOTAL CA: CPT

## 2020-07-31 PROCEDURE — 700105 HCHG RX REV CODE 258: Performed by: INTERNAL MEDICINE

## 2020-07-31 PROCEDURE — 85025 COMPLETE CBC W/AUTO DIFF WBC: CPT

## 2020-07-31 PROCEDURE — 83605 ASSAY OF LACTIC ACID: CPT

## 2020-07-31 PROCEDURE — 700111 HCHG RX REV CODE 636 W/ 250 OVERRIDE (IP): Performed by: INTERNAL MEDICINE

## 2020-07-31 RX ORDER — KETOROLAC TROMETHAMINE 10 MG/1
10 TABLET, FILM COATED ORAL EVERY 6 HOURS PRN
Qty: 12 TAB | Refills: 0 | Status: SHIPPED | OUTPATIENT
Start: 2020-07-31 | End: 2020-08-03

## 2020-07-31 RX ORDER — SULFAMETHOXAZOLE AND TRIMETHOPRIM 800; 160 MG/1; MG/1
1 TABLET ORAL 2 TIMES DAILY
Qty: 14 TAB | Refills: 0 | Status: SHIPPED | OUTPATIENT
Start: 2020-07-31 | End: 2020-08-07

## 2020-07-31 RX ORDER — AMOXICILLIN AND CLAVULANATE POTASSIUM 875; 125 MG/1; MG/1
1 TABLET, FILM COATED ORAL 2 TIMES DAILY
Qty: 14 TAB | Refills: 0 | Status: SHIPPED | OUTPATIENT
Start: 2020-07-31 | End: 2020-08-07

## 2020-07-31 RX ADMIN — VANCOMYCIN HYDROCHLORIDE 1750 MG: 500 INJECTION, POWDER, LYOPHILIZED, FOR SOLUTION INTRAVENOUS at 03:56

## 2020-07-31 RX ADMIN — KETOROLAC TROMETHAMINE 15 MG: 30 INJECTION, SOLUTION INTRAMUSCULAR at 01:09

## 2020-07-31 RX ADMIN — AMPICILLIN SODIUM AND SULBACTAM SODIUM 3 G: 2; 1 INJECTION, POWDER, FOR SOLUTION INTRAMUSCULAR; INTRAVENOUS at 01:20

## 2020-07-31 RX ADMIN — AMPICILLIN SODIUM AND SULBACTAM SODIUM 3 G: 2; 1 INJECTION, POWDER, FOR SOLUTION INTRAMUSCULAR; INTRAVENOUS at 07:31

## 2020-07-31 NOTE — PROGRESS NOTES
"Pharmacy Kinetics 45 y.o. female on vancomycin day # 2 2020    Currently on Vancomycin 1750 mg iv q12hr (0300,1500)  Provider specified end date: 20    Indication for Treatment: facial cellulitis/abscess    Pertinent history per medical record: Admitted on 2020 for right sided facial swelling, redness, and pain after expressing acne comedone, now with difficulty opening jaw and unable to eat solid food.  Started 7 day course of Augmentin 20 without significant improvement after two days.  PMH: meth abuse, depression.  CT showed possible abscess too small to drain.  RID recommending 10 day course of antibiotics, with Augmentin/Bactrim at discharge    Other antibiotics: Unasyn 3 g IV q6h    Allergies: Tape     List concerns for renal function: obesity    Pertinent cultures to date:    BCx NGTD    MRSA nares swab if pneumonia is a concern (ordered/positive/negative/n-a): positive    Recent Labs     20  1355 20  0401   WBC 10.5 10.1   NEUTSPOLYS 61.80 87.40*     Recent Labs     20  1355 20  0401   BUN 6* 7*   CREATININE 0.57 0.53   ALBUMIN 3.9  --      Recent Labs     20  1437   VANCOTROUGH 11.8       Intake/Output Summary (Last 24 hours) at 2020 1745  Last data filed at 2020 0800  Gross per 24 hour   Intake 460 ml   Output --   Net 460 ml      /68   Pulse (!) 113   Temp 36.8 °C (98.2 °F) (Oral)   Resp 18   Ht 1.651 m (5' 5\")   Wt 96.4 kg (212 lb 8.4 oz)   SpO2 93%  Temp (24hrs), Av.9 °C (98.4 °F), Min:36.6 °C (97.9 °F), Max:37.1 °C (98.8 °F)      A/P   1. Vancomycin dose: 1750 mg iv q12hr (0300,1500)  2. Next vancomycin level: 20 1430  3. Goal trough: 10-15 mcg/mL  4. Comments: will follow levels and adjust regimen as needed per protocol    Tod William, PharmD    "

## 2020-07-31 NOTE — DISCHARGE SUMMARY
Discharge Summary    CHIEF COMPLAINT ON ADMISSION  Chief Complaint   Patient presents with   • Facial Swelling     sat night had a breakout of acne and was given some amoxicillin, pt now presents with swollen right side of face. endorses pressure, pain and drainage.        Reason for Admission  face swollen      Admission Date  7/29/2020    CODE STATUS  Full Code    HPI & HOSPITAL COURSE  This is a 45 y.o. female here with right-sided facial cellulitis who failed outpatient antibiotic therapy. CT scan showed inflammation overlying the right mandible and possible phlegmon/small abscess. She was started on empiric Unasyn and Vancomycin. Infectious disease was consulted and suspected MRSA infection. She was kept here for IV antibiotic. Her erythema, swelling and tenderness improved. Leukocytosis resolved and she remained afebrile. She was discharged home on course of Augmentin and Bactrim for a total of 10 days with instructions to follow-up with PCP outpatient.        Therefore, she is discharged in good and stable condition to home with close outpatient follow-up.    The patient met 2-midnight criteria for an inpatient stay at the time of discharge.    Discharge Date  7/31/2020    FOLLOW UP ITEMS POST DISCHARGE  Follow-up with PCP     DISCHARGE DIAGNOSES  Active Problems:    Facial cellulitis POA: Yes    Obesity POA: Yes  Resolved Problems:    * No resolved hospital problems. *      FOLLOW UP  No future appointments.  WAYNE Snow  [address] [place] [id number] [phone]            MEDICATIONS ON DISCHARGE     Medication List      START taking these medications      Instructions   ketorolac 10 MG Tabs  Commonly known as:  TORADOL   Take 1 Tab by mouth every 6 hours as needed for Moderate Pain for up to 3 days.  Dose:  10 mg     sulfamethoxazole-trimethoprim 800-160 MG tablet  Commonly known as:  BACTRIM DS   Take 1 Tab by mouth 2 times a day for 7 days.  Dose:  1 Tab        CONTINUE taking these  medications      Instructions   amoxicillin-clavulanate 875-125 MG Tabs  Commonly known as:  AUGMENTIN   Take 1 Tab by mouth 2 times a day for 7 days.  Dose:  1 Tab     HYDROcodone-acetaminophen 5-325 MG Tabs per tablet  Commonly known as:  Norco   Take 1 Tab by mouth every four hours as needed for up to 7 days.  Dose:  1 Tab     ibuprofen 200 MG Tabs  Commonly known as:  MOTRIN   Take 800 mg by mouth every 8 hours as needed for Mild Pain.  Dose:  800 mg            Allergies  Allergies   Allergen Reactions   • Tape      Burns skin       DIET  Orders Placed This Encounter   Procedures   • Diet Order Regular     Standing Status:   Standing     Number of Occurrences:   1     Order Specific Question:   Diet:     Answer:   Regular [1]     Order Specific Question:   Texture Modifier     Answer:   Level 5 - Minced & Moist (Dysphagia 2)     Order Specific Question:   Liquid level     Answer:   Level 0 - Thin       ACTIVITY  As tolerated.  Weight bearing as tolerated    CONSULTATIONS  Infectious Disease, Dr. Ramos     PROCEDURES  None     LABORATORY  Lab Results   Component Value Date    SODIUM 137 07/31/2020    POTASSIUM 4.1 07/31/2020    CHLORIDE 106 07/31/2020    CO2 23 07/31/2020    GLUCOSE 109 (H) 07/31/2020    BUN 10 07/31/2020    CREATININE 0.59 07/31/2020        Lab Results   Component Value Date    WBC 9.6 07/31/2020    HEMOGLOBIN 11.3 (L) 07/31/2020    HEMATOCRIT 35.5 (L) 07/31/2020    PLATELETCT 266 07/31/2020        Total time of the discharge process exceeds 35 minutes.

## 2020-07-31 NOTE — DISCHARGE INSTRUCTIONS
Discharge instructions:    DIET: Resume home diet previous to admission    ACTIVITY: Resume previous level of activities.    DIAGNOSIS: Facial cellulitis.     Return to ER if fever greater than 38 degree Celsius or 100.4 degree Fahrenheit, worsening pain, chest pain at rest or associated with exertion, worsening shortness of breath, bloody coughs, bloody bowel movement, severe unrelenting abdominal pain, focal weakness.    ZAKIA Doll         Cellulitis, Adult    Cellulitis is a skin infection. The infected area is usually warm, red, swollen, and tender. This condition occurs most often in the arms and lower legs. The infection can travel to the muscles, blood, and underlying tissue and become serious. It is very important to get treated for this condition.  What are the causes?  Cellulitis is caused by bacteria. The bacteria enter through a break in the skin, such as a cut, burn, insect bite, open sore, or crack.  What increases the risk?  This condition is more likely to occur in people who:  · Have a weak body defense system (immune system).  · Have open wounds on the skin, such as cuts, burns, bites, and scrapes. Bacteria can enter the body through these open wounds.  · Are older than 60 years of age.  · Have diabetes.  · Have a type of long-lasting (chronic) liver disease (cirrhosis) or kidney disease.  · Are obese.  · Have a skin condition such as:  ? Itchy rash (eczema).  ? Slow movement of blood in the veins (venous stasis).  ? Fluid buildup below the skin (edema).  · Have had radiation therapy.  · Use IV drugs.  What are the signs or symptoms?  Symptoms of this condition include:  · Redness, streaking, or spotting on the skin.  · Swollen area of the skin.  · Tenderness or pain when an area of the skin is touched.  · Warm skin.  · A fever.  · Chills.  · Blisters.  How is this diagnosed?  This condition is diagnosed based on a medical history and physical exam. You may also have tests,  including:  · Blood tests.  · Imaging tests.  How is this treated?  Treatment for this condition may include:  · Medicines, such as antibiotic medicines or medicines to treat allergies (antihistamines).  · Supportive care, such as rest and application of cold or warm cloths (compresses) to the skin.  · Hospital care, if the condition is severe.  The infection usually starts to get better within 1-2 days of treatment.  Follow these instructions at home:    Medicines  · Take over-the-counter and prescription medicines only as told by your health care provider.  · If you were prescribed an antibiotic medicine, take it as told by your health care provider. Do not stop taking the antibiotic even if you start to feel better.  General instructions  · Drink enough fluid to keep your urine pale yellow.  · Do not touch or rub the infected area.  · Raise (elevate) the infected area above the level of your heart while you are sitting or lying down.  · Apply warm or cold compresses to the affected area as told by your health care provider.  · Keep all follow-up visits as told by your health care provider. This is important. These visits let your health care provider make sure a more serious infection is not developing.  Contact a health care provider if:  · You have a fever.  · Your symptoms do not begin to improve within 1-2 days of starting treatment.  · Your bone or joint underneath the infected area becomes painful after the skin has healed.  · Your infection returns in the same area or another area.  · You notice a swollen bump in the infected area.  · You develop new symptoms.  · You have a general ill feeling (malaise) with muscle aches and pains.  Get help right away if:  · Your symptoms get worse.  · You feel very sleepy.  · You develop vomiting or diarrhea that persists.  · You notice red streaks coming from the infected area.  · Your red area gets larger or turns dark in color.  These symptoms may represent a serious  problem that is an emergency. Do not wait to see if the symptoms will go away. Get medical help right away. Call your local emergency services (911 in the [place] Do not drive yourself to the hospital.  Summary  · Cellulitis is a skin infection. This condition occurs most often in the arms and lower legs.  · Treatment for this condition may include medicines, such as antibiotic medicines or antihistamines.  · Take over-the-counter and prescription medicines only as told by your health care provider. If you were prescribed an antibiotic medicine, do not stop taking the antibiotic even if you start to feel better.  · Contact a health care provider if your symptoms do not begin to improve within 1-2 days of starting treatment or your symptoms get worse.  · Keep all follow-up visits as told by your health care provider. This is important. These visits let your health care provider make sure that a more serious infection is not developing.  This information is not intended to replace advice given to you by your health care provider. Make sure you discuss any questions you have with your health care provider.  Document Released: 09/27/2006 Document Revised: 05/09/2019 Document Reviewed: 05/09/2019  [employer] © 2020 [employer]      Discharge Instructions    Discharged to home by car with relative. Discharged via wheelchair, hospital escort: Yes.  Special equipment needed: Not Applicable    Be sure to schedule a follow-up appointment with your primary care doctor or any specialists as instructed.     Discharge Plan:        I understand that a diet low in cholesterol, fat, and sodium is recommended for good health. Unless I have been given specific instructions below for another diet, I accept this instruction as my diet prescription.   Other diet: Regular diet    Special Instructions:     Ketorolac tablets  What is this medicine?  KETOROLAC (angel toe ROLE ak) is a non-steroidal anti-inflammatory drug  (NSAID). It is used for a short while to treat moderate to severe pain, including pain after surgery. It should not be used for more than 5 days.  This medicine may be used for other purposes; ask your health care provider or pharmacist if you have questions.  COMMON BRAND NAME(S): Toradol  What should I tell my health care provider before I take this medicine?  They need to know if you have any of these conditions:  · bleeding disorders  · cigarette smoker  · coronary artery bypass graft (CABG) surgery within the past 2 weeks  · drink more than 3 alcohol-containing drinks a day  · heart disease  · high blood pressure  · history of stomach bleeding  · kidney disease  · liver disease  · lung or breathing disease, like asthma  · stomach or intestine problems  · an unusual or allergic reaction to ketorolac, aspirin, other NSAIDs, other medicines, foods, dyes, or preservatives  · pregnant or trying to get pregnant  · breast-feeding  How should I use this medicine?  Take this medicine by mouth with a full glass of water. Follow the directions on the prescription label. Take your medicine at regular intervals. Do not take your medicine more often than directed. Do not take more than the recommended dose.  A special MedGuide will be given to you by the pharmacist with each prescription and refill. Be sure to read this information carefully each time.  Talk to your pediatrician regarding the use of this medicine in children. While this drug may be prescribed for children as young as 16 years of age for selected conditions, precautions do apply.  Patients over 65 years old may have a stronger reaction and need a smaller dose.  Overdosage: If you think you have taken too much of this medicine contact a poison control center or emergency room at once.  NOTE: This medicine is only for you. Do not share this medicine with others.  What if I miss a dose?  If you miss a dose, take it as soon as you can. If it is almost time for  your next dose, take only that dose. Do not take double or extra doses.  What may interact with this medicine?  Do not take this medicine with any of the following medications:  · aspirin and aspirin-like medicines  · cidofovir  · methotrexate  · NSAIDs, medicines for pain and inflammation, like ibuprofen or naproxen  · pemetrexed  · probenecid  This medicine may also interact with the following medications:  · alcohol  · alendronate  · alprazolam  · carbamazepine  · cyclosporine  · diuretics  · flavocoxid  · fluoxetine  · ginkgo  · lithium  · medicines for high blood pressure like enalapril  · medicines that affect platelets like pentoxifylline  · medicines that treat or prevent blood clots like heparin, warfarin  · muscle relaxants  · phenytoin  · steroid medicines like prednisone or cortisone  · thiothixene  This list may not describe all possible interactions. Give your health care provider a list of all the medicines, herbs, non-prescription drugs, or dietary supplements you use. Also tell them if you smoke, drink alcohol, or use illegal drugs. Some items may interact with your medicine.  What should I watch for while using this medicine?  Tell your doctor or healthcare provider if your pain does not get better. Talk to your doctor before taking another medicine for pain. Do not treat yourself.  This medicine may cause serious skin reactions. They can happen weeks to months after starting the medicine. Contact your healthcare provider right away if you notice fevers or flu-like symptoms with a rash. The rash may be red or purple and then turn into blisters or peeling of the skin. Or, you might notice a red rash with swelling of the face, lips or lymph nodes in your neck or under your arms.  This medicine does not prevent heart attack or stroke. In fact, this medicine may increase the chance of a heart attack or stroke. The chance may increase with longer use of this medicine and in people who have heart  disease. If you take aspirin to prevent heart attack or stroke, talk with your doctor or healthcare provider.  Do not take medicines such as ibuprofen and naproxen with this medicine. Side effects such as stomach upset, nausea, or ulcers may be more likely to occur. Many medicines available without a prescription should not be taken with this medicine.  This medicine can cause ulcers and bleeding in the stomach and intestines at any time during treatment. Do not smoke cigarettes or drink alcohol. These increase irritation to your stomach and can make it more susceptible to damage from this medicine. Ulcers and bleeding can happen without warning symptoms and can cause death.  You may get drowsy or dizzy. Do not drive, use machinery, or do anything that needs mental alertness until you know how this medicine affects you. Do not stand or sit up quickly, especially if you are an older patient. This reduces the risk of dizzy or fainting spells.  This medicine can cause you to bleed more easily. Try to avoid damage to your teeth and gums when you brush or floss your teeth.  What side effects may I notice from receiving this medicine?  Side effects that you should report to your doctor or health care professional as soon as possible:  · allergic reactions like skin rash, itching or hives, swelling of the face, lips, or tongue  · breathing problems  · high blood pressure  · nausea, vomiting  · redness, blistering, peeling or loosening of the skin, including inside the mouth  · severe stomach pain  · signs and symptoms of bleeding such as bloody or black, tarry stools; red or dark-brown urine; spitting up blood or brown material that looks like coffee grounds; red spots on the skin; unusual bruising or bleeding from the eye, gums, or nose  · signs and symptoms of a stroke like changes in vision; confusion; trouble speaking or understanding; severe headaches; sudden numbness or weakness of the face, arm or leg; trouble  walking; dizziness; loss of balance or coordination  · trouble passing urine or change in the amount of urine  · unexplained weight gain or swelling  · unusually weak or tired  · yellowing of eyes or skin  Side effects that usually do not require medical attention (report to your doctor or health care professional if they continue or are bothersome):  · diarrhea  · dizziness  · headache  · heartburn  This list may not describe all possible side effects. Call your doctor for medical advice about side effects. You may report side effects to FDA at 6-076-PZH-8124.  Where should I keep my medicine?  Keep out of the reach of children.  Store at room temperature between 20 and 25 degrees C (68 and 77 degrees F). Throw away any unused medicine after the expiration date.  NOTE: This sheet is a summary. It may not cover all possible information. If you have questions about this medicine, talk to your doctor, pharmacist, or health care provider.  © 2020 [employer] (2020-03-04 15:15:50)    Sulfamethoxazole; Trimethoprim, SMX-TMP tablets  What is this medicine?  SULFAMETHOXAZOLE; TRIMETHOPRIM or SMX-TMP (suhl fuh meth OK nela zohl; trye METH oh prim) is a combination of a sulfonamide antibiotic and a second antibiotic, trimethoprim. It is used to treat or prevent certain kinds of bacterial infections. It will not work for colds, flu, or other viral infections.  This medicine may be used for other purposes; ask your health care provider or pharmacist if you have questions.  COMMON BRAND NAME(S): Bacter-Aid DS, Bactrim, Bactrim DS, Septra, Septra DS  What should I tell my health care provider before I take this medicine?  They need to know if you have any of these conditions:  · anemia  · asthma  · being treated with anticonvulsants  · if you frequently drink alcohol containing drinks  · kidney disease  · liver disease  · low level of folic acid or glucose-6-phosphate dehydrogenase  · poor nutrition or  malabsorption  · porphyria  · severe allergies  · thyroid disorder  · an unusual or allergic reaction to sulfamethoxazole, trimethoprim, sulfa drugs, other medicines, foods, dyes, or preservatives  · pregnant or trying to get pregnant  · breast-feeding  How should I use this medicine?  Take this medicine by mouth with a full glass of water. Follow the directions on the prescription label. Take your medicine at regular intervals. Do not take it more often than directed. Do not skip doses or stop your medicine early.  Talk to your pediatrician regarding the use of this medicine in children. Special care may be needed. This medicine has been used in children as young as 2 months of age.  Overdosage: If you think you have taken too much of this medicine contact a poison control center or emergency room at once.  NOTE: This medicine is only for you. Do not share this medicine with others.  What if I miss a dose?  If you miss a dose, take it as soon as you can. If it is almost time for your next dose, take only that dose. Do not take double or extra doses.  What may interact with this medicine?  Do not take this medicine with any of the following medications:  · aminobenzoate potassium  · dofetilide  · metronidazole  This medicine may also interact with the following medications:  · ACE inhibitors like benazepril, enalapril, lisinopril, and ramipril  · birth control pills  · cyclosporine  · digoxin  · diuretics  · indomethacin  · medicines for diabetes  · methenamine  · methotrexate  · phenytoin  · potassium supplements  · pyrimethamine  · sulfinpyrazone  · tricyclic antidepressants  · warfarin  This list may not describe all possible interactions. Give your health care provider a list of all the medicines, herbs, non-prescription drugs, or dietary supplements you use. Also tell them if you smoke, drink alcohol, or use illegal drugs. Some items may interact with your medicine.  What should I watch for while using this  medicine?  Tell your doctor or health care professional if your symptoms do not improve. Drink several glasses of water a day to reduce the risk of kidney problems.  Do not treat diarrhea with over the counter products. Contact your doctor if you have diarrhea that lasts more than 2 days or if it is severe and watery.  This medicine can make you more sensitive to the sun. Keep out of the sun. If you cannot avoid being in the sun, wear protective clothing and use a sunscreen. Do not use sun lamps or tanning beds/booths.  What side effects may I notice from receiving this medicine?  Side effects that you should report to your doctor or health care professional as soon as possible:  · allergic reactions like skin rash or hives, swelling of the face, lips, or tongue  · breathing problems  · fever or chills, sore throat  · irregular heartbeat, chest pain  · joint or muscle pain  · pain or difficulty passing urine  · red pinpoint spots on skin  · redness, blistering, peeling or loosening of the skin, including inside the mouth  · unusual bleeding or bruising  · unusually weak or tired  · yellowing of the eyes or skin  Side effects that usually do not require medical attention (report to your doctor or health care professional if they continue or are bothersome):  · diarrhea  · dizziness  · headache  · loss of appetite  · nausea, vomiting  · nervousness  This list may not describe all possible side effects. Call your doctor for medical advice about side effects. You may report side effects to FDA at 7-025-FDA-1442.  Where should I keep my medicine?  Keep out of the reach of children.  Store at room temperature between 20 to 25 degrees C (68 to 77 degrees F). Protect from light. Throw away any unused medicine after the expiration date.  NOTE: This sheet is a summary. It may not cover all possible information. If you have questions about this medicine, talk to your doctor, pharmacist, or health care provider.  © 2020  [employer] (2014-07-25 14:38:26)      · Is patient discharged on Warfarin / Coumadin?   No     Depression / Suicide Risk    As you are discharged from this [facility] facility, it is important to learn how to keep safe from harming yourself.    Recognize the warning signs:  · Abrupt changes in personality, positive or negative- including increase in energy   · Giving away possessions  · Change in eating patterns- significant weight changes-  positive or negative  · Change in sleeping patterns- unable to sleep or sleeping all the time   · Unwillingness or inability to communicate  · Depression  · Unusual sadness, discouragement and loneliness  · Talk of wanting to die  · Neglect of personal appearance   · Rebelliousness- reckless behavior  · Withdrawal from people/activities they love  · Confusion- inability to concentrate     If you or a loved one observes any of these behaviors or has concerns about self-harm, here's what you can do:  · Talk about it- your feelings and reasons for harming yourself  · Remove any means that you might use to hurt yourself (examples: pills, rope, extension cords, firearm)  · Get professional help from the community (Mental Health, Substance Abuse, psychological counseling)  · Do not be alone:Call your Safe Contact- someone whom you trust who will be there for you.  · Call your local CRISIS HOTLINE 548-8523 or 808-156-5039  · Call your local Children's Mobile Crisis Response Team [place] (324) 616-6420 or www.[employer]  · Call the toll free National Suicide Prevention Hotlines   · National Suicide Prevention Lifeline 287-299-FFSX (0937)  · National Hope Line Network 800-SUICIDE (988-5759)

## 2020-07-31 NOTE — PROGRESS NOTES
2130 - Pt swabbed for covid and sample sent to lab.    0000 - Pt reporting intense burning at the IV site shortly after unasyn was hung. IV site appears swollen. IV discontinued. This RN and 2 other RNs attempted to insert IV with no success. ICU RN with ultrasound at now bedside to attempt insertion.   IV successfully placed in the right upper arm but ICU RN with US. Unasyn infusing.    No other events overnight.

## 2020-08-03 LAB
BACTERIA BLD CULT: NORMAL
BACTERIA BLD CULT: NORMAL
SIGNIFICANT IND 70042: NORMAL
SIGNIFICANT IND 70042: NORMAL
SITE SITE: NORMAL
SITE SITE: NORMAL
SOURCE SOURCE: NORMAL
SOURCE SOURCE: NORMAL

## 2020-09-10 ENCOUNTER — HOSPITAL ENCOUNTER (EMERGENCY)
Facility: MEDICAL CENTER | Age: 45
End: 2020-09-10
Attending: EMERGENCY MEDICINE
Payer: MEDICAID

## 2020-09-10 VITALS
SYSTOLIC BLOOD PRESSURE: 132 MMHG | TEMPERATURE: 98.3 F | OXYGEN SATURATION: 97 % | HEIGHT: 65 IN | DIASTOLIC BLOOD PRESSURE: 98 MMHG | RESPIRATION RATE: 14 BRPM | HEART RATE: 117 BPM | BODY MASS INDEX: 34.45 KG/M2 | WEIGHT: 206.79 LBS

## 2020-09-10 DIAGNOSIS — L01.00 IMPETIGO ANY SITE: ICD-10-CM

## 2020-09-10 PROCEDURE — 99283 EMERGENCY DEPT VISIT LOW MDM: CPT

## 2020-09-10 PROCEDURE — 700101 HCHG RX REV CODE 250: Performed by: EMERGENCY MEDICINE

## 2020-09-10 RX ORDER — SULFAMETHOXAZOLE AND TRIMETHOPRIM 800; 160 MG/1; MG/1
1 TABLET ORAL 2 TIMES DAILY
Qty: 14 TAB | Refills: 0 | Status: SHIPPED | OUTPATIENT
Start: 2020-09-10 | End: 2020-09-17

## 2020-09-10 RX ADMIN — MUPIROCIN 1 APPLICATION: 20 OINTMENT TOPICAL at 21:30

## 2020-09-10 ASSESSMENT — FIBROSIS 4 INDEX: FIB4 SCORE: 0.55

## 2020-09-11 NOTE — ED PROVIDER NOTES
ED Provider Note    CHIEF COMPLAINT  Chief Complaint   Patient presents with   • Rash     Pt has history of MRSA, has rash on left heel that goes all the way around heel and in between toes.  Rash is raw and had yellow drainage 2 days ago.       HPI  Amalia Gonzalez is a 45 y.o. female who presents for an area excoriation on the left heel with erythema.  Patient apparently has a history of MRSA.  She was hospitalized for a facial MRSA cellulitis around 2 months ago.  She denies high fevers or chills.  Around a week ago she noted some skin breakdown and some erythema with some small pustules noted to the lateral aspect of her heel.  It was somewhat pruritic it became erythematous and then encompassed her whole left heel.  She then noticed that she had some breakdown in between her toes.  She denies high fevers or chills.  She is currently not on any antibiotics.  Denies any other skin lesions or areas of erythema or pain    REVIEW OF SYSTEMS  See HPI for further details.  No high fevers chills night sweats weight loss all other systems are negative.     PAST MEDICAL HISTORY  Past Medical History:   Diagnosis Date   • Substance abuse (HCC) 2015    meth pt. states stopped with pregnancy.   • Depression 2004    Pt. states was put on Lexapro   • Allergy    • Headache(784.0)    • MRSA (methicillin resistant Staphylococcus aureus)    • Urinary tract infection, site not specified        FAMILY HISTORY  Noncontributory    SOCIAL HISTORY  Social History     Socioeconomic History   • Marital status: Single     Spouse name: Not on file   • Number of children: Not on file   • Years of education: Not on file   • Highest education level: Not on file   Occupational History   • Not on file   Social Needs   • Financial resource strain: Not on file   • Food insecurity     Worry: Not on file     Inability: Not on file   • Transportation needs     Medical: Not on file     Non-medical: Not on file   Tobacco Use   • Smoking status:  "Current Every Day Smoker     Packs/day: 0.25     Years: 20.00     Pack years: 5.00     Types: Cigarettes     Start date: 2015   • Smokeless tobacco: Never Used   • Tobacco comment: 5-10 cigs/day   Substance and Sexual Activity   • Alcohol use: No     Comment: occ   • Drug use: No     Types: Methamphetamines     Comment: iv meth, quit 7 months ago   • Sexual activity: Yes     Partners: Male     Birth control/protection: Condom   Lifestyle   • Physical activity     Days per week: Not on file     Minutes per session: Not on file   • Stress: Not on file   Relationships   • Social connections     Talks on phone: Not on file     Gets together: Not on file     Attends [employer] service: Not on file     Active member of club or organization: Not on file     Attends meetings of clubs or organizations: Not on file     Relationship status: Not on file   • Intimate partner violence     Fear of current or ex partner: Not on file     Emotionally abused: Not on file     Physically abused: Not on file     Forced sexual activity: Not on file   Other Topics Concern   • Not on file   Social History Narrative   • Not on file     Active smoker  SURGICAL HISTORY  Past Surgical History:   Procedure Laterality Date   • REPEAT C SECTION W TUBAL LIGATION N/A 2015    Procedure: REPEAT C SECTION WITH TUBAL LIGATION;  Surgeon: Bela Bansal M.D.;  Location: LABOR AND DELIVERY;  Service:    • PRIMARY C SECTION         • TONSILLECTOMY         CURRENT MEDICATIONS  Home Medications     Reviewed by Lynne Cabrera R.N. (Registered Nurse) on 09/10/20 at 204  Med List Status: Partial   Medication Last Dose Status   ibuprofen (MOTRIN) 200 MG Tab 2020 Active                ALLERGIES  Allergies   Allergen Reactions   • Tape      Nath skin       PHYSICAL EXAM  VITAL SIGNS: /98   Pulse (!) 117   Temp 36.8 °C (98.3 °F) (Temporal)   Resp 14   Ht 1.651 m (5' 5\")   Wt 93.8 kg (206 lb 12.7 oz)   LMP 2020 " (Approximate)   SpO2 97%   BMI 34.41 kg/m²       Constitutional: Well developed, Well nourished, No acute distress, Non-toxic appearance.   HENT: Normocephalic, Atraumatic, Bilateral external ears normal, Oropharynx moist, No oral exudates, Nose normal.   Eyes: PERRLA, EOMI, Conjunctiva normal, No discharge.   Neck: Normal range of motion, No tenderness, Supple, No stridor.   Cardiovascular: Mild tachycardia, Normal rhythm, No murmurs, No rubs, No gallops.   Thorax & Lungs: Normal breath sounds, No respiratory distress, No wheezing, No chest tenderness.   Abdomen: Bowel sounds normal, Soft, No tenderness, No masses, No pulsatile masses.   Skin: Warm, Dry, No erythema, No rash.   Back: No tenderness, No CVA tenderness.   Extremities: I left lower extremity exam is notable for some erythema with some purulence and some honey colored crusting noted around the heel.  Around 4 x 6 cm surface area is not circumferential there is no lymphangitis no abscess.  Small honey colored crusted lesions noted in between the second and third toes.  Neurologic: Alert & oriented x 3, Normal motor function, Normal sensory function, No focal deficits noted.   Psychiatric: Affect normal, Judgment normal, Mood normal.         COURSE & MEDICAL DECISION MAKING  Pertinent Labs & Imaging studies reviewed. (See chart for details)  The patient does not appear toxic.  She has mild tachycardia but has no fever.  She does not clinically appear to be toxic.  I reviewed her culture history she never grew out anything from a wound or blood that could be speciated.  She simply was MRSA positive from her nares.  With having said this looks like this could be possibly MRSA cellulitis.  I will start her on Bactrim and mupirocin ointment.    FINAL IMPRESSION  1.  Left foot cellulitis with impetigo      Electronically signed by: Javad Gutiérrez M.D., 9/10/2020 9:03 PM

## 2020-09-11 NOTE — ED TRIAGE NOTES
"Pt here with c/o    Chief Complaint   Patient presents with   • Rash     Pt has history of MRSA, has rash on left heel that goes all the way around heel and in between toes.  Rash is raw and had yellow drainage 2 days ago.       /98   Pulse (!) 117   Temp 36.8 °C (98.3 °F) (Temporal)   Resp 14   Ht 1.651 m (5' 5\")   Wt 93.8 kg (206 lb 12.7 oz)   LMP 08/12/2020 (Approximate)   SpO2 97%   BMI 34.41 kg/m²   "

## 2020-09-11 NOTE — DISCHARGE INSTRUCTIONS
Apply provided ointment with dressings twice daily.  Take full course of antibiotics as discussed.  Return immediately for high fevers worsening redness pain or swelling

## 2021-09-22 NOTE — PROGRESS NOTES
Received report and assumed care. Pt AOx4.   Reporting some soreness on the right jaw but denies intervention at this time as she was medicated for pain about an hour ago.  Right jaw looks swollen and has a couple scabs but no redness or warmth.  Pt said she had one episode of diarrhea today.  Plan of care discussed with pt. No other needs at this time.  Bed locked in lowest position. Call light within reach.     Clindamycin Pregnancy And Lactation Text: This medication can be used in pregnancy if certain situations. Clindamycin is also present in breast milk.

## 2021-10-28 ENCOUNTER — PHARMACY VISIT (OUTPATIENT)
Dept: PHARMACY | Facility: MEDICAL CENTER | Age: 46
End: 2021-10-28
Payer: COMMERCIAL

## 2021-10-28 ENCOUNTER — OFFICE VISIT (OUTPATIENT)
Dept: URGENT CARE | Facility: CLINIC | Age: 46
End: 2021-10-28
Payer: MEDICAID

## 2021-10-28 VITALS
HEART RATE: 84 BPM | SYSTOLIC BLOOD PRESSURE: 112 MMHG | OXYGEN SATURATION: 97 % | DIASTOLIC BLOOD PRESSURE: 78 MMHG | BODY MASS INDEX: 34.82 KG/M2 | TEMPERATURE: 98.2 F | HEIGHT: 65 IN | RESPIRATION RATE: 20 BRPM | WEIGHT: 209 LBS

## 2021-10-28 DIAGNOSIS — N39.0 URINARY TRACT INFECTION WITH HEMATURIA, SITE UNSPECIFIED: ICD-10-CM

## 2021-10-28 DIAGNOSIS — R31.9 URINARY TRACT INFECTION WITH HEMATURIA, SITE UNSPECIFIED: ICD-10-CM

## 2021-10-28 DIAGNOSIS — R39.15 URINARY URGENCY: ICD-10-CM

## 2021-10-28 PROBLEM — R53.83 FATIGUE: Status: ACTIVE | Noted: 2021-10-28

## 2021-10-28 PROBLEM — F17.200 TOBACCO DEPENDENCE SYNDROME: Status: ACTIVE | Noted: 2021-10-28

## 2021-10-28 LAB
APPEARANCE UR: CLEAR
BILIRUB UR STRIP-MCNC: NORMAL MG/DL
COLOR UR AUTO: YELLOW
GLUCOSE UR STRIP.AUTO-MCNC: NEGATIVE MG/DL
KETONES UR STRIP.AUTO-MCNC: NEGATIVE MG/DL
LEUKOCYTE ESTERASE UR QL STRIP.AUTO: NORMAL
NITRITE UR QL STRIP.AUTO: POSITIVE
PH UR STRIP.AUTO: 6 [PH] (ref 5–8)
PROT UR QL STRIP: NEGATIVE MG/DL
RBC UR QL AUTO: NORMAL
SP GR UR STRIP.AUTO: 1.02
UROBILINOGEN UR STRIP-MCNC: 0.2 MG/DL

## 2021-10-28 PROCEDURE — 81002 URINALYSIS NONAUTO W/O SCOPE: CPT | Performed by: NURSE PRACTITIONER

## 2021-10-28 PROCEDURE — 99213 OFFICE O/P EST LOW 20 MIN: CPT | Performed by: NURSE PRACTITIONER

## 2021-10-28 PROCEDURE — RXMED WILLOW AMBULATORY MEDICATION CHARGE: Performed by: NURSE PRACTITIONER

## 2021-10-28 RX ORDER — PHENAZOPYRIDINE HYDROCHLORIDE 200 MG/1
200 TABLET, FILM COATED ORAL 3 TIMES DAILY PRN
Qty: 6 TABLET | Refills: 0 | Status: SHIPPED | OUTPATIENT
Start: 2021-10-28

## 2021-10-28 RX ORDER — IBUPROFEN 600 MG/1
600 TABLET ORAL 3 TIMES DAILY
Qty: 15 TABLET | Refills: 0 | Status: SHIPPED | OUTPATIENT
Start: 2021-10-28 | End: 2021-10-30

## 2021-10-28 RX ORDER — SULFAMETHOXAZOLE AND TRIMETHOPRIM 800; 160 MG/1; MG/1
1 TABLET ORAL 2 TIMES DAILY
Qty: 10 TABLET | Refills: 0 | Status: SHIPPED | OUTPATIENT
Start: 2021-10-28 | End: 2021-11-02

## 2021-10-28 ASSESSMENT — ENCOUNTER SYMPTOMS
FEVER: 0
FLANK PAIN: 1

## 2021-10-28 ASSESSMENT — FIBROSIS 4 INDEX: FIB4 SCORE: 0.56

## 2021-10-28 NOTE — PROGRESS NOTES
Subjective     Amalia Gonzalez is a 46 y.o. female who presents with No chief complaint on file.            Urinary Frequency  This is a new problem. Episode onset: pt reports new onset of urinary urgency, frequency and low abd pain that started one week ago. Admits to some mild flank pain on the left. No fevers. +night sweats. No vomiting. The problem has been unchanged. Pertinent negatives include no fever. She has tried nothing for the symptoms.       Review of Systems   Constitutional: Negative for fever.   Genitourinary: Positive for flank pain (right), frequency and urgency.   All other systems reviewed and are negative.         Past Medical History:   Diagnosis Date   • Allergy    • Depression     Pt. states was put on Lexapro   • Headache(784.0)    • MRSA (methicillin resistant Staphylococcus aureus)    • Substance abuse (HCC)     meth pt. states stopped with pregnancy.   • Urinary tract infection, site not specified       Past Surgical History:   Procedure Laterality Date   • REPEAT C SECTION W TUBAL LIGATION N/A 2015    Procedure: REPEAT C SECTION WITH TUBAL LIGATION;  Surgeon: Bela Bansal M.D.;  Location: LABOR AND DELIVERY;  Service:    • PRIMARY C SECTION         • TONSILLECTOMY        Social History     Socioeconomic History   • Marital status: Single     Spouse name: Not on file   • Number of children: Not on file   • Years of education: Not on file   • Highest education level: Not on file   Occupational History   • Not on file   Tobacco Use   • Smoking status: Current Every Day Smoker     Packs/day: 0.25     Years: 20.00     Pack years: 5.00     Types: Cigarettes     Start date: 2015   • Smokeless tobacco: Never Used   • Tobacco comment: 5-10 cigs/day   Vaping Use   • Vaping Use: Never used   Substance and Sexual Activity   • Alcohol use: No     Comment: occ   • Drug use: No     Types: Methamphetamines     Comment: iv meth, quit 7 months ago   • Sexual activity: Yes  "    Partners: Male     Birth control/protection: Condom   Other Topics Concern   • Not on file   Social History Narrative   • Not on file     Social Determinants of Health     Financial Resource Strain:    • Difficulty of Paying Living Expenses:    Food Insecurity:    • Worried About Running Out of Food in the Last Year:    • Ran Out of Food in the Last Year:    Transportation Needs:    • Lack of Transportation (Medical):    • Lack of Transportation (Non-Medical):    Physical Activity:    • Days of Exercise per Week:    • Minutes of Exercise per Session:    Stress:    • Feeling of Stress :    Social Connections:    • Frequency of Communication with Friends and Family:    • Frequency of Social Gatherings with Friends and Family:    • Attends J[employer] Services:    • Active Member of Clubs or Organizations:    • Attends Club or Organization Meetings:    • Marital Status:    Intimate Partner Violence:    • Fear of Current or Ex-Partner:    • Emotionally Abused:    • Physically Abused:    • Sexually Abused:          Objective     /78   Pulse 84   Temp 36.8 °C (98.2 °F)   Resp 20   Ht 1.651 m (5' 5\")   Wt 94.8 kg (209 lb)   SpO2 97%   BMI 34.78 kg/m²      Physical Exam  Vitals and nursing note reviewed.   Constitutional:       Appearance: Normal appearance. She is normal weight.   HENT:      Head: Normocephalic and atraumatic.      Nose: Nose normal.      Mouth/Throat:      Mouth: Mucous membranes are moist.      Pharynx: Oropharynx is clear.   Eyes:      Extraocular Movements: Extraocular movements intact.      Pupils: Pupils are equal, round, and reactive to light.   Cardiovascular:      Rate and Rhythm: Normal rate and regular rhythm.   Pulmonary:      Effort: Pulmonary effort is normal.   Abdominal:      Tenderness: There is abdominal tenderness in the suprapubic area. There is right CVA tenderness. There is no left CVA tenderness.   Musculoskeletal:         General: Normal range of motion.      Cervical " back: Normal range of motion.   Skin:     General: Skin is warm and dry.      Capillary Refill: Capillary refill takes less than 2 seconds.   Neurological:      General: No focal deficit present.      Mental Status: She is alert and oriented to person, place, and time. Mental status is at baseline.   Psychiatric:         Mood and Affect: Mood normal.         Speech: Speech normal.         Thought Content: Thought content normal.         Judgment: Judgment normal.                  Lab Results   Component Value Date/Time    POCCOLOR Yellow 10/28/2021 10:49 AM    POCCOLOR Yellow 06/01/2015 02:00 PM    POCAPPEAR Clear 10/28/2021 10:49 AM    POCAPPEAR Clear 06/01/2015 02:00 PM    POCLEUKEST Moderate 10/28/2021 10:49 AM    POCLEUKEST Negative 06/01/2015 02:00 PM    POCNITRITE Positive 10/28/2021 10:49 AM    POCNITRITE Positive (A) 06/01/2015 02:00 PM    POCUROBILIGE 0.2 10/28/2021 10:49 AM    POCPROTEIN Negative 10/28/2021 10:49 AM    POCPROTEIN Negative 06/01/2015 02:00 PM    POCURPH 6.0 10/28/2021 10:49 AM    POCURPH 5.0 06/01/2015 02:00 PM    POCBLOOD Trace-Intact 10/28/2021 10:49 AM    POCBLOOD Negative 06/01/2015 02:00 PM    POCSPGRV 1.020 10/28/2021 10:49 AM    POCSPGRV 1.020 06/01/2015 02:00 PM    POCKETONES Negative 10/28/2021 10:49 AM    POCKETONES Negative 06/01/2015 02:00 PM    POCBILIRUBIN Newgative 10/28/2021 10:49 AM    POCGLUCUA Negative 10/28/2021 10:49 AM    POCGLUCUA Negative 06/01/2015 02:00 PM                 Assessment & Plan        1. Urinary urgency  - POCT Urinalysis    2. Urinary tract infection with hematuria, site unspecified  - sulfamethoxazole-trimethoprim (BACTRIM DS) 800-160 MG tablet; Take 1 Tablet by mouth 2 times a day for 5 days.  Dispense: 10 Tablet; Refill: 0  - URINE CULTURE(NEW); Future  - phenazopyridine (PYRIDIUM) 200 MG Tab; Take 1 Tablet by mouth 3 times a day as needed.  Dispense: 6 Tablet; Refill: 0       Will call with cx results if I need to change abx  Take full course of  abx  Increase water intake  Tylenol and ibuprofen as needed for pain  Supportive care, differential diagnoses, and indications for immediate follow-up discussed with patient.    Pathogenesis of diagnosis discussed including typical length and natural progression.      Instructed to return to  or nearest emergency department if symptoms fail to improve, for any change in condition, further concerns, or new concerning symptoms.  Patient states understanding of the plan of care and discharge instructions.

## 2021-10-30 ENCOUNTER — HOSPITAL ENCOUNTER (OUTPATIENT)
Facility: MEDICAL CENTER | Age: 46
End: 2021-10-30
Attending: NURSE PRACTITIONER
Payer: MEDICAID

## 2021-10-30 ENCOUNTER — OFFICE VISIT (OUTPATIENT)
Dept: URGENT CARE | Facility: CLINIC | Age: 46
End: 2021-10-30
Payer: MEDICAID

## 2021-10-30 VITALS
HEART RATE: 73 BPM | DIASTOLIC BLOOD PRESSURE: 84 MMHG | TEMPERATURE: 98.6 F | WEIGHT: 209.2 LBS | HEIGHT: 65 IN | SYSTOLIC BLOOD PRESSURE: 126 MMHG | OXYGEN SATURATION: 97 % | BODY MASS INDEX: 34.85 KG/M2 | RESPIRATION RATE: 16 BRPM

## 2021-10-30 DIAGNOSIS — R10.30 LOWER ABDOMINAL PAIN, UNSPECIFIED: ICD-10-CM

## 2021-10-30 DIAGNOSIS — N89.8 VAGINAL ITCHING: ICD-10-CM

## 2021-10-30 DIAGNOSIS — Z72.51 HIGH RISK HETEROSEXUAL BEHAVIOR: ICD-10-CM

## 2021-10-30 LAB
FORWARD REASON: SPWHY: NORMAL
FORWARDED TO LAB: SPWHR: NORMAL
SPECIMEN SENT: SPWT1: NORMAL

## 2021-10-30 PROCEDURE — 99214 OFFICE O/P EST MOD 30 MIN: CPT | Mod: 25 | Performed by: NURSE PRACTITIONER

## 2021-10-30 RX ORDER — DOXYCYCLINE HYCLATE 100 MG
100 TABLET ORAL 2 TIMES DAILY
Qty: 14 TABLET | Refills: 0 | Status: SHIPPED | OUTPATIENT
Start: 2021-10-30 | End: 2021-11-06

## 2021-10-30 RX ORDER — IBUPROFEN 800 MG/1
800 TABLET ORAL EVERY 8 HOURS PRN
Qty: 90 TABLET | Refills: 0 | Status: SHIPPED | OUTPATIENT
Start: 2021-10-30

## 2021-10-30 RX ORDER — FLUCONAZOLE 150 MG/1
150 TABLET ORAL DAILY
Qty: 2 TABLET | Refills: 1 | Status: SHIPPED | OUTPATIENT
Start: 2021-10-30

## 2021-10-30 ASSESSMENT — ENCOUNTER SYMPTOMS
FEVER: 0
ABDOMINAL PAIN: 1
FLANK PAIN: 0
CHILLS: 0
HEADACHES: 0
BACK PAIN: 0

## 2021-10-30 ASSESSMENT — FIBROSIS 4 INDEX: FIB4 SCORE: 0.56

## 2021-10-30 NOTE — PROGRESS NOTES
Subjective     Amalia Gonzalez is a 46 y.o. female who presents with Vaginitis (Possible yeast infection)            HPI   New problem.  Patient is a 46-year-old female who presents with possible vaginitis.  She has had her symptoms for several days.  She describes itching and burning with urination and feels this may be a possible yeast infection.  She reports that she has been sexually active with more than one partner without the use of condoms.  She is inquiring whether she can get STD testing today here as well.  She denies fever, chills, or back pain.  She does report some lower left quadrant pain with the symptoms.  She denies urinary frequency or urgency.  She has not taken any medications for her symptoms.  She is currently on [facility] treatment for drug addiction.    Tape  Current Outpatient Medications on File Prior to Visit   Medication Sig Dispense Refill   • sulfamethoxazole-trimethoprim (BACTRIM DS) 800-160 MG tablet Take 1 Tablet by mouth 2 times a day for 5 days. 10 Tablet 0   • phenazopyridine (PYRIDIUM) 200 MG Tab Take 1 Tablet by mouth 3 times a day as needed. 6 Tablet 0     No current facility-administered medications on file prior to visit.     Social History     Socioeconomic History   • Marital status: Single     Spouse name: Not on file   • Number of children: Not on file   • Years of education: Not on file   • Highest education level: Not on file   Occupational History   • Not on file   Tobacco Use   • Smoking status: Former Smoker     Packs/day: 0.25     Years: 20.00     Pack years: 5.00     Types: Cigarettes     Start date: 2/1/2015   • Smokeless tobacco: Never Used   • Tobacco comment: 5-10 cigs/day   Vaping Use   • Vaping Use: Never used   Substance and Sexual Activity   • Alcohol use: No   • Drug use: No     Comment: iv meth, quit 7 months ago   • Sexual activity: Yes     Partners: Male     Birth control/protection: Condom   Other Topics Concern   • Not on file   Social History  "Narrative   • Not on file     Social Determinants of Health     Financial Resource Strain:    • Difficulty of Paying Living Expenses:    Food Insecurity:    • Worried About Running Out of Food in the Last Year:    • Ran Out of Food in the Last Year:    Transportation Needs:    • Lack of Transportation (Medical):    • Lack of Transportation (Non-Medical):    Physical Activity:    • Days of Exercise per Week:    • Minutes of Exercise per Session:    Stress:    • Feeling of Stress :    Social Connections:    • Frequency of Communication with Friends and Family:    • Frequency of Social Gatherings with Friends and Family:    • Attends Judaism Services:    • Active Member of Clubs or Organizations:    • Attends Club or Organization Meetings:    • Marital Status:    Intimate Partner Violence:    • Fear of Current or Ex-Partner:    • Emotionally Abused:    • Physically Abused:    • Sexually Abused:      Breast Cancer-related family history is not on file.      Review of Systems   Constitutional: Negative for chills and fever.   Gastrointestinal: Positive for abdominal pain.   Genitourinary: Negative for dysuria, flank pain, frequency and urgency.        +vaginal itching   Musculoskeletal: Negative for back pain.   Neurological: Negative for headaches.              Objective     /84   Pulse 73   Temp 37 °C (98.6 °F)   Resp 16   Ht 1.651 m (5' 5\")   Wt 94.9 kg (209 lb 3.2 oz)   SpO2 97%   BMI 34.81 kg/m²      Physical Exam  Vitals and nursing note reviewed.   Constitutional:       General: She is not in acute distress.     Appearance: She is well-developed.   Cardiovascular:      Rate and Rhythm: Normal rate and regular rhythm.      Heart sounds: Normal heart sounds. No murmur heard.     Pulmonary:      Effort: Pulmonary effort is normal. No respiratory distress.      Breath sounds: Normal breath sounds.   Abdominal:      General: Bowel sounds are normal.      Palpations: Abdomen is soft.      Tenderness: " There is abdominal tenderness in the suprapubic area.   Genitourinary:     Comments: Deferred, self swab  Musculoskeletal:         General: Normal range of motion.      Comments: Moves all 4 extremities normally   Skin:     General: Skin is warm and dry.   Neurological:      Mental Status: She is alert and oriented to person, place, and time.   Psychiatric:         Behavior: Behavior normal.         Thought Content: Thought content normal.                             Assessment & Plan         1. High risk heterosexual behavior     2. Vaginal itching  CHLAMYDIA/GC PCR URINE OR SWAB    VAGINAL PATHOGENS DNA PANEL    doxycycline (VIBRAMYCIN) 100 MG Tab    cefTRIAXone (ROCEPHIN) 500 mg, lidocaine (XYLOCAINE) 1 % 1.8 mL for IM use    fluconazole (DIFLUCAN) 150 MG tablet    ibuprofen (MOTRIN) 800 MG Tab   3. Lower abdominal pain, unspecified     stop bactrim as she reports mild rash to arms with itching.  Will call with results.            [phone].

## 2021-11-04 ENCOUNTER — HOSPITAL ENCOUNTER (EMERGENCY)
Facility: MEDICAL CENTER | Age: 46
End: 2021-11-04
Attending: EMERGENCY MEDICINE
Payer: MEDICAID

## 2021-11-04 ENCOUNTER — APPOINTMENT (OUTPATIENT)
Dept: RADIOLOGY | Facility: MEDICAL CENTER | Age: 46
End: 2021-11-04
Attending: EMERGENCY MEDICINE
Payer: MEDICAID

## 2021-11-04 VITALS
SYSTOLIC BLOOD PRESSURE: 118 MMHG | WEIGHT: 209.88 LBS | BODY MASS INDEX: 34.97 KG/M2 | HEART RATE: 76 BPM | TEMPERATURE: 98 F | DIASTOLIC BLOOD PRESSURE: 75 MMHG | OXYGEN SATURATION: 96 % | HEIGHT: 65 IN | RESPIRATION RATE: 18 BRPM

## 2021-11-04 DIAGNOSIS — A59.01 TRICHOMONAS VAGINITIS: ICD-10-CM

## 2021-11-04 DIAGNOSIS — R10.2 PELVIC PAIN: ICD-10-CM

## 2021-11-04 DIAGNOSIS — A53.9 SYPHILIS: ICD-10-CM

## 2021-11-04 LAB
ALBUMIN SERPL BCP-MCNC: 3.8 G/DL (ref 3.2–4.9)
ALBUMIN/GLOB SERPL: 1.2 G/DL
ALP SERPL-CCNC: 64 U/L (ref 30–99)
ALT SERPL-CCNC: 19 U/L (ref 2–50)
ANION GAP SERPL CALC-SCNC: 10 MMOL/L (ref 7–16)
APPEARANCE UR: CLEAR
AST SERPL-CCNC: 26 U/L (ref 12–45)
BASOPHILS # BLD AUTO: 0.7 % (ref 0–1.8)
BASOPHILS # BLD: 0.05 K/UL (ref 0–0.12)
BILIRUB SERPL-MCNC: 0.2 MG/DL (ref 0.1–1.5)
BILIRUB UR QL STRIP.AUTO: NEGATIVE
BUN SERPL-MCNC: 13 MG/DL (ref 8–22)
CALCIUM SERPL-MCNC: 9.4 MG/DL (ref 8.5–10.5)
CANDIDA DNA VAG QL PROBE+SIG AMP: NEGATIVE
CHLORIDE SERPL-SCNC: 107 MMOL/L (ref 96–112)
CO2 SERPL-SCNC: 21 MMOL/L (ref 20–33)
COLOR UR: ABNORMAL
CREAT SERPL-MCNC: 0.63 MG/DL (ref 0.5–1.4)
EOSINOPHIL # BLD AUTO: 0.19 K/UL (ref 0–0.51)
EOSINOPHIL NFR BLD: 2.7 % (ref 0–6.9)
EPI CELLS #/AREA URNS HPF: ABNORMAL /HPF
ERYTHROCYTE [DISTWIDTH] IN BLOOD BY AUTOMATED COUNT: 47.3 FL (ref 35.9–50)
G VAGINALIS DNA VAG QL PROBE+SIG AMP: NEGATIVE
GLOBULIN SER CALC-MCNC: 3.1 G/DL (ref 1.9–3.5)
GLUCOSE SERPL-MCNC: 90 MG/DL (ref 65–99)
GLUCOSE UR STRIP.AUTO-MCNC: NEGATIVE MG/DL
HCG SERPL QL: NEGATIVE
HCT VFR BLD AUTO: 38.6 % (ref 37–47)
HGB BLD-MCNC: 12.3 G/DL (ref 12–16)
IMM GRANULOCYTES # BLD AUTO: 0.02 K/UL (ref 0–0.11)
IMM GRANULOCYTES NFR BLD AUTO: 0.3 % (ref 0–0.9)
KETONES UR STRIP.AUTO-MCNC: NEGATIVE MG/DL
LEUKOCYTE ESTERASE UR QL STRIP.AUTO: ABNORMAL
LYMPHOCYTES # BLD AUTO: 2.89 K/UL (ref 1–4.8)
LYMPHOCYTES NFR BLD: 40.5 % (ref 22–41)
MCH RBC QN AUTO: 28 PG (ref 27–33)
MCHC RBC AUTO-ENTMCNC: 31.9 G/DL (ref 33.6–35)
MCV RBC AUTO: 87.7 FL (ref 81.4–97.8)
MICRO URNS: ABNORMAL
MONOCYTES # BLD AUTO: 0.51 K/UL (ref 0–0.85)
MONOCYTES NFR BLD AUTO: 7.1 % (ref 0–13.4)
NEUTROPHILS # BLD AUTO: 3.48 K/UL (ref 2–7.15)
NEUTROPHILS NFR BLD: 48.7 % (ref 44–72)
NITRITE UR QL STRIP.AUTO: NEGATIVE
NRBC # BLD AUTO: 0 K/UL
NRBC BLD-RTO: 0 /100 WBC
PH UR STRIP.AUTO: 5.5 [PH] (ref 5–8)
PLATELET # BLD AUTO: 293 K/UL (ref 164–446)
PMV BLD AUTO: 12.4 FL (ref 9–12.9)
POTASSIUM SERPL-SCNC: 4.8 MMOL/L (ref 3.6–5.5)
PROT SERPL-MCNC: 6.9 G/DL (ref 6–8.2)
PROT UR QL STRIP: 30 MG/DL
RBC # BLD AUTO: 4.4 M/UL (ref 4.2–5.4)
RBC # URNS HPF: >150 /HPF
RBC UR QL AUTO: ABNORMAL
RPR SER QL: NON REACTIVE
SODIUM SERPL-SCNC: 138 MMOL/L (ref 135–145)
SP GR UR STRIP.AUTO: >=1.03
T VAGINALIS DNA VAG QL PROBE+SIG AMP: POSITIVE
TREPONEMA PALLIDUM IGG+IGM AB [PRESENCE] IN SERUM OR PLASMA BY IMMUNOASSAY: REACTIVE
UROBILINOGEN UR STRIP.AUTO-MCNC: 0.2 MG/DL
WBC # BLD AUTO: 7.1 K/UL (ref 4.8–10.8)
WBC #/AREA URNS HPF: ABNORMAL /HPF

## 2021-11-04 PROCEDURE — 85025 COMPLETE CBC W/AUTO DIFF WBC: CPT

## 2021-11-04 PROCEDURE — 99284 EMERGENCY DEPT VISIT MOD MDM: CPT

## 2021-11-04 PROCEDURE — 87491 CHLMYD TRACH DNA AMP PROBE: CPT

## 2021-11-04 PROCEDURE — 86592 SYPHILIS TEST NON-TREP QUAL: CPT

## 2021-11-04 PROCEDURE — 91300 HCHG RX REV CODE 636 W/ 250 OVERRIDE (IP): CPT | Performed by: EMERGENCY MEDICINE

## 2021-11-04 PROCEDURE — 87660 TRICHOMONAS VAGIN DIR PROBE: CPT

## 2021-11-04 PROCEDURE — 87591 N.GONORRHOEAE DNA AMP PROB: CPT

## 2021-11-04 PROCEDURE — 84703 CHORIONIC GONADOTROPIN ASSAY: CPT

## 2021-11-04 PROCEDURE — 76856 US EXAM PELVIC COMPLETE: CPT

## 2021-11-04 PROCEDURE — 700111 HCHG RX REV CODE 636 W/ 250 OVERRIDE (IP): Performed by: EMERGENCY MEDICINE

## 2021-11-04 PROCEDURE — 87510 GARDNER VAG DNA DIR PROBE: CPT

## 2021-11-04 PROCEDURE — 81001 URINALYSIS AUTO W/SCOPE: CPT

## 2021-11-04 PROCEDURE — 0001A HCHG PFIZER COVID ADMIN 1ST DOSE: CPT

## 2021-11-04 PROCEDURE — 86780 TREPONEMA PALLIDUM: CPT

## 2021-11-04 PROCEDURE — 80053 COMPREHEN METABOLIC PANEL: CPT

## 2021-11-04 PROCEDURE — 96372 THER/PROPH/DIAG INJ SC/IM: CPT

## 2021-11-04 PROCEDURE — RXMED WILLOW AMBULATORY MEDICATION CHARGE: Performed by: EMERGENCY MEDICINE

## 2021-11-04 PROCEDURE — 87480 CANDIDA DNA DIR PROBE: CPT

## 2021-11-04 RX ORDER — METRONIDAZOLE 500 MG/1
500 TABLET ORAL 2 TIMES DAILY
Qty: 14 TABLET | Refills: 0 | Status: SHIPPED | OUTPATIENT
Start: 2021-11-04 | End: 2021-11-15

## 2021-11-04 RX ADMIN — PENICILLIN G BENZATHINE 1.2 MILLION UNITS: 1200000 INJECTION, SUSPENSION INTRAMUSCULAR at 16:27

## 2021-11-04 RX ADMIN — PENICILLIN G BENZATHINE 1.2 MILLION UNITS: 1200000 INJECTION, SUSPENSION INTRAMUSCULAR at 17:00

## 2021-11-04 RX ADMIN — RNA INGREDIENT BNT-162B2 0.3 ML: 0.23 INJECTION, SUSPENSION INTRAMUSCULAR at 14:32

## 2021-11-04 ASSESSMENT — FIBROSIS 4 INDEX: FIB4 SCORE: 0.56
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
## 2021-11-04 NOTE — ED PROVIDER NOTES
ED Provider Note    CHIEF COMPLAINT  Chief Complaint   Patient presents with   • Abdominal Pain     Seen at  thursday was given bactrim for UTI, then went back on saturday and got different medications   • Vaginal Discharge   • Vaginal Itching       HPI  Amalia Gonzalez is a 46 y.o. female who presents for evaluation of ongoing left-sided pelvic pain that seems to be still in the hip as well associated with vaginal pain and discharge and discomfort, burning with urination.  She went to urgent care several days ago, she currently had a positive urinalysis and was started on Bactrim.  She returned the next day with increasing vaginal pain and discharge, she was treated with an intramuscular injection of ceftriaxone, oral doxycycline as well as Diflucan but states that no testing of any sort was done.  She does have a history of syphilis and gonorrhea, states that she was in a monogamous relationship with a male partner who ultimately got her infected with these.  She states that she has since gone back to having unprotected intercourse with that partner.  She states the pain does involve her lower abdomen, asymmetrically on the left side involves her back as well.  No diarrhea, nausea but no vomiting.  No fever.    REVIEW OF SYSTEMS  Negative for fever, rash, chest pain, dyspnea, vomiting, diarrhea, headache. All other systems are negative.     PAST MEDICAL HISTORY  Past Medical History:   Diagnosis Date   • Allergy    • Depression 2004    Pt. states was put on Lexapro   • Headache(784.0)    • MRSA (methicillin resistant Staphylococcus aureus)    • Substance abuse (HCC) 2015    meth pt. states stopped with pregnancy.   • Urinary tract infection, site not specified        FAMILY HISTORY  Family History   Problem Relation Age of Onset   • Hyperlipidemia Mother    • Hypertension Father    • Hyperlipidemia Father        SOCIAL HISTORY  Social History     Tobacco Use   • Smoking status: Former Smoker     Packs/day: 0.25  "    Years: 20.00     Pack years: 5.00     Types: Cigarettes     Start date: 2015   • Smokeless tobacco: Never Used   • Tobacco comment: 5-10 cigs/day   Vaping Use   • Vaping Use: Never used   Substance Use Topics   • Alcohol use: No   • Drug use: No     Comment: iv meth, quit 7 months ago       SURGICAL HISTORY  Past Surgical History:   Procedure Laterality Date   • REPEAT C SECTION W TUBAL LIGATION N/A 2015    Procedure: REPEAT C SECTION WITH TUBAL LIGATION;  Surgeon: Bela Bansal M.D.;  Location: LABOR AND DELIVERY;  Service:    • PRIMARY C SECTION         • TONSILLECTOMY         CURRENT MEDICATIONS  I personally reviewed the medication list in the charting documentation.     ALLERGIES  Allergies   Allergen Reactions   • Tape      Nath skin       MEDICAL RECORD  I have reviewed patient's medical record and pertinent results are listed above.      PHYSICAL EXAM  VITAL SIGNS: /89   Pulse 86   Temp 36.8 °C (98.2 °F) (Temporal)   Resp 14   Ht 1.651 m (5' 5\")   Wt 95.2 kg (209 lb 14.1 oz)   LMP 2021   SpO2 95%   BMI 34.93 kg/m²    Constitutional: Well appearing patient in no acute distress.  Awake and alert, not toxic nor ill in appearance.  HENT: Normocephalic, no obvious evidence of acute trauma.  Eyes: No scleral icterus. Normal conjunctiva   Neck: Comfortable movement without any obvious restriction in the range of motion.  Thorax & Lungs: Normal nonlabored respirations.    Abdomen: The abdomen is not visibly distended.  Upon palpation she does have focal tenderness overlying the left side of the pelvis, the remainder of the examination reveals no tenderness.  : Vulvitis noted on inspection, there is a small amount of blood in the vaginal vault, difficult to assess for discharge  Skin: The exposed portions of skin reveal no obvious rash or other abnormalities.  Neurologic: Alert & oriented. No focal deficits observed.   Psychiatric: Normal affect appropriate for the " clinical situation.    DIAGNOSTIC STUDIES / PROCEDURES    LABS/EKGs  Results for orders placed or performed during the hospital encounter of 11/04/21   CBC WITH DIFFERENTIAL   Result Value Ref Range    WBC 7.1 4.8 - 10.8 K/uL    RBC 4.40 4.20 - 5.40 M/uL    Hemoglobin 12.3 12.0 - 16.0 g/dL    Hematocrit 38.6 37.0 - 47.0 %    MCV 87.7 81.4 - 97.8 fL    MCH 28.0 27.0 - 33.0 pg    MCHC 31.9 (L) 33.6 - 35.0 g/dL    RDW 47.3 35.9 - 50.0 fL    Platelet Count 293 164 - 446 K/uL    MPV 12.4 9.0 - 12.9 fL    Neutrophils-Polys 48.70 44.00 - 72.00 %    Lymphocytes 40.50 22.00 - 41.00 %    Monocytes 7.10 0.00 - 13.40 %    Eosinophils 2.70 0.00 - 6.90 %    Basophils 0.70 0.00 - 1.80 %    Immature Granulocytes 0.30 0.00 - 0.90 %    Nucleated RBC 0.00 /100 WBC    Neutrophils (Absolute) 3.48 2.00 - 7.15 K/uL    Lymphs (Absolute) 2.89 1.00 - 4.80 K/uL    Monos (Absolute) 0.51 0.00 - 0.85 K/uL    Eos (Absolute) 0.19 0.00 - 0.51 K/uL    Baso (Absolute) 0.05 0.00 - 0.12 K/uL    Immature Granulocytes (abs) 0.02 0.00 - 0.11 K/uL    NRBC (Absolute) 0.00 K/uL   COMP METABOLIC PANEL   Result Value Ref Range    Sodium 138 135 - 145 mmol/L    Potassium 4.8 3.6 - 5.5 mmol/L    Chloride 107 96 - 112 mmol/L    Co2 21 20 - 33 mmol/L    Anion Gap 10.0 7.0 - 16.0    Glucose 90 65 - 99 mg/dL    Bun 13 8 - 22 mg/dL    Creatinine 0.63 0.50 - 1.40 mg/dL    Calcium 9.4 8.5 - 10.5 mg/dL    AST(SGOT) 26 12 - 45 U/L    ALT(SGPT) 19 2 - 50 U/L    Alkaline Phosphatase 64 30 - 99 U/L    Total Bilirubin 0.2 0.1 - 1.5 mg/dL    Albumin 3.8 3.2 - 4.9 g/dL    Total Protein 6.9 6.0 - 8.2 g/dL    Globulin 3.1 1.9 - 3.5 g/dL    A-G Ratio 1.2 g/dL   CHLAMYDIA & GC BY PCR    Specimen: Genital   Result Value Ref Range    Source Vaginal    URINALYSIS (UA)    Specimen: Urine   Result Value Ref Range    Color Red (A)     Character Clear     Specific Gravity >=1.030 <1.035    Ph 5.5 5.0 - 8.0    Glucose Negative Negative mg/dL    Ketones Negative Negative mg/dL    Protein  30 (A) Negative mg/dL    Bilirubin Negative Negative    Urobilinogen, Urine 0.2 Negative    Nitrite Negative Negative    Leukocyte Esterase Small (A) Negative    Occult Blood Large (A) Negative    Micro Urine Req Microscopic    HCG QUAL SERUM   Result Value Ref Range    Beta-Hcg Qualitative Serum Negative Negative   T.PALLIDUM AB EIA (Syphilis)   Result Value Ref Range    Syphilis, Treponemal Qual Reactive (A) Non-Reactive   VAGINAL PATHOGENS DNA PANEL   Result Value Ref Range    Candida species DNA Probe Negative Negative    Trichamonas vaginalis DNA Probe POSITIVE (A) Negative    Gardnerella vaginalis DNA Probe Negative Negative   URINE MICROSCOPIC (W/UA)   Result Value Ref Range    WBC 10-20 (A) /hpf    RBC >150 (A) /hpf    Epithelial Cells Few /hpf   ESTIMATED GFR   Result Value Ref Range    GFR If African American >60 >60 mL/min/1.73 m 2    GFR If Non African American >60 >60 mL/min/1.73 m 2   RPR (SYPHILIS)   Result Value Ref Range    Rapid Plasma Reagin -Rpr- Non Reactive Non Reactive        RADIOLOGY  US-PELVIC COMPLETE (TRANSABDOMINAL/TRANSVAGINAL) (COMBO)   Final Result      Normal uterus and ovaries. No adnexal fluid collections.      No pelvic free fluid.            COURSE & MEDICAL DECISION MAKING  I have reviewed any medical record information, laboratory studies and radiographic results as noted above.  Differential diagnoses includes: UTI, tubo-ovarian abscess, STI, trichomonas, bacterial vaginosis, vaginal candidiasis, ovarian torsion    Encounter Summary: This is a very pleasant 46 y.o. female who unfortunately required evaluation in the emergency department today with ongoing left-sided pelvic pain and generalized lower abdominal pain with burning with urination and vaginal discharge for the past week.  Currently is being treated with Bactrim for UTI, doxycycline for possible chlamydia, was treated with an intramuscular junction of ceftriaxone for possible gonorrhea and also is on Diflucan.  Other  than urinalysis no other testing was performed.  She has tenderness overlying left side of her pelvis.  Will obtain an ultrasound of her pelvis, will obtain blood work including a syphilis test that she does have a history of this and has since began to engage in unprotected sexual intercourse with the same partner.  She also has undergone vaginal swabbing, urinalysis and she will be reevaluated ------- this work-up reveals a positive syphilis test, also positive for trichomonas.  Pelvic ultrasound is unremarkable.  She has been treated here in emergency department with IM injection of penicillin.  Will be prescribed a 7-day course of Flagyl.  Patient is being discharged home in stable condition will follow up with a primary care provider.      DISPOSITION: Discharged home in stable condition      FINAL IMPRESSION  1. Syphilis    2. Trichomonas vaginitis    3. Pelvic pain           This dictation was created using voice recognition software. The accuracy of the dictation is limited to the abilities of the software. I expect there may be some errors of grammar and possibly content. The nursing notes were reviewed and certain aspects of this information were incorporated into this note.    Electronically signed by: Sarmad Costello M.D., 11/4/2021 12:52 PM

## 2021-11-04 NOTE — ED NOTES
Received Covid vaccination from pharmacist. Pt not currently in room. Will administer when Pt returns.   None

## 2021-11-04 NOTE — ED NOTES
Discharge teaching and paperwork provided regarding trichomonas, syphilis & pelvic pain and all questions/concerns answered. VSS, assessment stable. Given information regarding home care and reasons to follow up with ED or primary MD. Patient provided with metronidazole Rx. Patient discharged and ambulatory out of the ED.

## 2021-11-06 LAB — T PALLIDUM AB SER QL AGGL: REACTIVE

## 2021-11-08 ENCOUNTER — PHARMACY VISIT (OUTPATIENT)
Dept: PHARMACY | Facility: MEDICAL CENTER | Age: 46
End: 2021-11-08
Payer: COMMERCIAL

## 2021-11-10 ENCOUNTER — PHARMACY VISIT (OUTPATIENT)
Dept: PHARMACY | Facility: MEDICAL CENTER | Age: 46
End: 2021-11-10
Payer: COMMERCIAL

## 2021-11-10 PROCEDURE — RXMED WILLOW AMBULATORY MEDICATION CHARGE: Performed by: PSYCHIATRY & NEUROLOGY

## 2021-11-10 RX ORDER — PRAZOSIN HYDROCHLORIDE 1 MG/1
1 CAPSULE ORAL
Qty: 12 CAPSULE | Refills: 0 | OUTPATIENT
Start: 2021-11-10

## 2021-11-11 LAB
C TRACH DNA SPEC QL NAA+PROBE: NEGATIVE
N GONORRHOEA DNA SPEC QL NAA+PROBE: NEGATIVE
SPECIMEN SOURCE: NORMAL

## 2021-11-17 ENCOUNTER — PHARMACY VISIT (OUTPATIENT)
Dept: PHARMACY | Facility: MEDICAL CENTER | Age: 46
End: 2021-11-17
Payer: COMMERCIAL

## 2021-11-17 PROCEDURE — RXMED WILLOW AMBULATORY MEDICATION CHARGE: Performed by: PSYCHIATRY & NEUROLOGY

## 2021-11-17 RX ORDER — OXCARBAZEPINE 300 MG/1
TABLET, FILM COATED ORAL
Qty: 24 TABLET | Refills: 0 | Status: SHIPPED | OUTPATIENT
Start: 2021-11-17 | End: 2021-11-24 | Stop reason: SDUPTHER

## 2021-11-24 ENCOUNTER — PHARMACY VISIT (OUTPATIENT)
Dept: PHARMACY | Facility: MEDICAL CENTER | Age: 46
End: 2021-11-24
Payer: COMMERCIAL

## 2021-11-24 PROCEDURE — RXMED WILLOW AMBULATORY MEDICATION CHARGE: Performed by: PSYCHIATRY & NEUROLOGY

## 2021-11-24 RX ORDER — OXCARBAZEPINE 300 MG/1
TABLET, FILM COATED ORAL
Qty: 120 TABLET | Refills: 1 | OUTPATIENT
Start: 2021-11-24

## 2023-03-16 ENCOUNTER — APPOINTMENT (OUTPATIENT)
Dept: RADIOLOGY | Facility: MEDICAL CENTER | Age: 48
End: 2023-03-16
Attending: EMERGENCY MEDICINE
Payer: MEDICAID

## 2023-03-16 ENCOUNTER — HOSPITAL ENCOUNTER (EMERGENCY)
Facility: MEDICAL CENTER | Age: 48
End: 2023-03-16
Attending: EMERGENCY MEDICINE
Payer: MEDICAID

## 2023-03-16 VITALS
DIASTOLIC BLOOD PRESSURE: 70 MMHG | BODY MASS INDEX: 29.99 KG/M2 | SYSTOLIC BLOOD PRESSURE: 110 MMHG | RESPIRATION RATE: 16 BRPM | OXYGEN SATURATION: 100 % | WEIGHT: 180 LBS | HEIGHT: 65 IN | TEMPERATURE: 98.3 F | HEART RATE: 97 BPM

## 2023-03-16 DIAGNOSIS — L03.116 CELLULITIS OF LEFT LOWER EXTREMITY: ICD-10-CM

## 2023-03-16 DIAGNOSIS — M25.572 ACUTE LEFT ANKLE PAIN: ICD-10-CM

## 2023-03-16 PROCEDURE — 700102 HCHG RX REV CODE 250 W/ 637 OVERRIDE(OP): Performed by: EMERGENCY MEDICINE

## 2023-03-16 PROCEDURE — 73610 X-RAY EXAM OF ANKLE: CPT | Mod: LT

## 2023-03-16 PROCEDURE — A9270 NON-COVERED ITEM OR SERVICE: HCPCS | Performed by: EMERGENCY MEDICINE

## 2023-03-16 PROCEDURE — 99283 EMERGENCY DEPT VISIT LOW MDM: CPT

## 2023-03-16 PROCEDURE — 73630 X-RAY EXAM OF FOOT: CPT | Mod: LT

## 2023-03-16 RX ORDER — IBUPROFEN 600 MG/1
600 TABLET ORAL EVERY 6 HOURS PRN
Qty: 30 TABLET | Refills: 0 | Status: SHIPPED | OUTPATIENT
Start: 2023-03-16

## 2023-03-16 RX ORDER — SULFAMETHOXAZOLE AND TRIMETHOPRIM 800; 160 MG/1; MG/1
1 TABLET ORAL 2 TIMES DAILY
Qty: 14 TABLET | Refills: 0 | Status: ACTIVE | OUTPATIENT
Start: 2023-03-16 | End: 2023-03-23

## 2023-03-16 RX ORDER — SULFAMETHOXAZOLE AND TRIMETHOPRIM 800; 160 MG/1; MG/1
1 TABLET ORAL ONCE
Status: COMPLETED | OUTPATIENT
Start: 2023-03-16 | End: 2023-03-16

## 2023-03-16 RX ORDER — CEPHALEXIN 500 MG/1
1000 CAPSULE ORAL 3 TIMES DAILY
Qty: 42 CAPSULE | Refills: 0 | Status: ACTIVE | OUTPATIENT
Start: 2023-03-16 | End: 2023-03-23

## 2023-03-16 RX ORDER — HYDROCODONE BITARTRATE AND ACETAMINOPHEN 5; 325 MG/1; MG/1
1 TABLET ORAL EVERY 4 HOURS PRN
Qty: 8 TABLET | Refills: 0 | Status: SHIPPED | OUTPATIENT
Start: 2023-03-16 | End: 2023-03-21

## 2023-03-16 RX ORDER — CEPHALEXIN 500 MG/1
1000 CAPSULE ORAL ONCE
Status: COMPLETED | OUTPATIENT
Start: 2023-03-16 | End: 2023-03-16

## 2023-03-16 RX ORDER — HYDROCODONE BITARTRATE AND ACETAMINOPHEN 5; 325 MG/1; MG/1
1 TABLET ORAL ONCE
Status: COMPLETED | OUTPATIENT
Start: 2023-03-16 | End: 2023-03-16

## 2023-03-16 RX ADMIN — HYDROCODONE BITARTRATE AND ACETAMINOPHEN 1 TABLET: 5; 325 TABLET ORAL at 23:26

## 2023-03-16 RX ADMIN — CEPHALEXIN 1000 MG: 500 CAPSULE ORAL at 23:39

## 2023-03-16 RX ADMIN — SULFAMETHOXAZOLE AND TRIMETHOPRIM 1 TABLET: 800; 160 TABLET ORAL at 23:39

## 2023-03-16 ASSESSMENT — PAIN DESCRIPTION - PAIN TYPE: TYPE: ACUTE PAIN

## 2023-03-16 ASSESSMENT — FIBROSIS 4 INDEX: FIB4 SCORE: 0.94

## 2023-03-17 NOTE — ED NOTES
Pt wheeled to . Connected to VS, resting comfortably NAD w/ equal chest rise/fall. Chart up for ERP.

## 2023-03-17 NOTE — ED PROVIDER NOTES
ED Provider Note    CHIEF COMPLAINT  Chief Complaint   Patient presents with    Ankle Pain     Pt reports that one week ago she hit her left ankle on a bed frame and today she reports that the pain in her heel became unbearable.        EXTERNAL RECORDS REVIEWED  No recent ER visits or admissions    HPI/ROS  LIMITATION TO HISTORY   Select: : None  OUTSIDE HISTORIAN(S):  None    Amalia Gonzalez is a 48 y.o. female who presents to the emergency department with left ankle pain.  The patient states she hit her left ankle on a bed frame about a week ago which initially was very painful.  She states that it was sore over the last week however not very significant.  She reports that over the last 2 days she has had increasing pain and swelling on the outside aspect of her left foot and heel.  No known fevers, calf pain or leg swelling.  She has not taken any medications prior to coming in.  She does have a history of IV drug use however states her last use was a few months ago.    PAST MEDICAL HISTORY   has a past medical history of Allergy, Depression (2004), Headache(784.0), MRSA (methicillin resistant Staphylococcus aureus), Substance abuse ([facility]) (2015), and Urinary tract infection, site not specified.    SURGICAL HISTORY   has a past surgical history that includes tonsillectomy; primary c section; and repeat c section w tubal ligation (N/A, 11/27/2015).    FAMILY HISTORY  Family History   Problem Relation Age of Onset    Hyperlipidemia Mother     Hypertension Father     Hyperlipidemia Father        SOCIAL HISTORY  Social History     Tobacco Use    Smoking status: Former     Packs/day: 0.25     Years: 20.00     Pack years: 5.00     Types: Cigarettes     Start date: 2/1/2015    Smokeless tobacco: Never    Tobacco comments:     5-10 cigs/day   Vaping Use    Vaping Use: Never used   Substance and Sexual Activity    Alcohol use: No    Drug use: No     Comment: iv meth, quit 7 months ago    Sexual activity: Yes     Partners:  "Male     Birth control/protection: Condom       CURRENT MEDICATIONS  Home Medications       Reviewed by Christie Edwards R.N. (Registered Nurse) on 03/16/23 at 2212  Med List Status: Not Addressed     Medication Last Dose Status   fluconazole (DIFLUCAN) 150 MG tablet  Active   ibuprofen (MOTRIN) 800 MG Tab  Active   OXcarbazepine (TRILEPTAL) 300 MG Tab  Active   phenazopyridine (PYRIDIUM) 200 MG Tab  Active   prazosin (MINIPRESS) 1 MG Cap  Active                    ALLERGIES  Allergies   Allergen Reactions    Tape      Nath skin       PHYSICAL EXAM  VITAL SIGNS: /70   Pulse 97   Temp 36.8 °C (98.3 °F)   Resp 16   Ht 1.651 m (5' 5\")   Wt 81.6 kg (180 lb)   LMP 02/16/2023 (Approximate)   SpO2 100%   BMI 29.95 kg/m²    Constitutional: Nontoxic appearing. Alert in no apparent distress.  HENT: Normocephalic, Atraumatic. Bilateral external ears normal. Nose normal.  Moist mucous membranes.    Neck: Supple, full range of motion  Musculoskeletal: Left ankle with mild swelling however mainly localized to the lateral aspect behind the posterior malleolus which is erythematous.  She has pain with range of motion of the ankle however is not exquisite.  No swelling that extends up the leg or calf tenderness.  Skin: Warm, Dry.  No erythema, No rash.   Neurologic: Alert and oriented x3. Moving all extremities spontaneously without focal deficits.  Psychiatric: Affect normal, Mood normal, Appears appropriate and not intoxicated.          DIAGNOSTIC STUDIES / PROCEDURES      RADIOLOGY  I have independently interpreted the diagnostic imaging associated with this visit and am waiting the final reading from the radiologist.   My preliminary interpretation is as follows: no fracture  Radiologist interpretation:   DX-FOOT-COMPLETE 3+ LEFT   Final Result         1.  No acute traumatic bony injury.      DX-ANKLE 3+ VIEWS LEFT   Final Result         1.  No acute traumatic bony injury.               COURSE & MEDICAL DECISION " MAKING    ED Observation Status? No; Patient does not meet criteria for ED Observation.     INITIAL ASSESSMENT, COURSE AND PLAN  Care Narrative: Patient presents with increasing lateral foot pain after minor trauma one week ago.  She has some erythema and warmth to the lateral aspect of the foot and ankle.  No associated fevers.  Xrays do not show evidence of underlying fracture or joint effusion.  Swelling and redness seem localized to the lateral aspect rather than the ankle joint.  She does have pain with range of motion however not exquisite.  Seems more consistent with cellulitis rather than septic joint.  Plan to discharge on antibiotics with return for reassessment in 48 hours if not improving.    Upon reassessment, patient is resting comfortably with normal vital signs.  No new complaints at this time.  Discussed results with patient and/or family as well as importance of primary care follow up.  Patient understands plan of care and strict return precautions for new or changing symptoms.         ADDITIONAL PROBLEM LIST  Problem #1:  Left lower extremity cellulitis - discharge on cephalexin and bactrim with strict return precautions.      DISPOSITION AND DISCUSSIONS  Escalation of care considered, and ultimately not performed:blood analysis and arthrocentesis - see above    Barriers to care at this time, including but not limited to: Patient lacks financial resources.       DISPOSITION:  Patient will be discharged home in stable condition.    FOLLOW UP:  WAYNE Snow  [address] [place] [address] [phone] [phone]    Schedule an appointment as soon as possible for a visit       [facility], Emergency Dept  [address] [facility] [address] [phone]    If symptoms worsen      OUTPATIENT MEDICATIONS:  Discharge Medication List as of 3/16/2023 11:38 PM        START taking these medications    Details   cephALEXin (KEFLEX) 500 MG Cap Take 2 Capsules by mouth 3 times a day for  7 days., Disp-42 Capsule, R-0, Normal      sulfamethoxazole-trimethoprim (BACTRIM DS) 800-160 MG tablet Take 1 Tablet by mouth 2 times a day for 7 days., Disp-14 Tablet, R-0, Normal      !! ibuprofen (MOTRIN) 600 MG Tab Take 1 Tablet by mouth every 6 hours as needed for Mild Pain or Moderate Pain., Disp-30 Tablet, R-0, Normal      HYDROcodone-acetaminophen (NORCO) 5-325 MG Tab per tablet Take 1 Tablet by mouth every four hours as needed (severe pain) for up to 5 days., Disp-8 Tablet, R-0, Normal       !! - Potential duplicate medications found. Please discuss with provider.            FINAL DIAGNOSIS  1. Cellulitis of left lower extremity    2. Acute left ankle pain       In prescribing controlled substances to this patient, I certify that I have obtained and reviewed the medical history of Amalia Gonzalez. I have also made a good aguilar effort to obtain applicable records from other providers who have treated the patient and records did not demonstrate any increased risk of substance abuse that would prevent me from prescribing controlled substances.     I have conducted a physical exam and documented it. I have reviewed Ms. Gonzalez’s prescription history as maintained by the [place] Prescription Monitoring Program.     I have assessed the patient’s risk for abuse, dependency, and addiction using the validated Opioid Risk Tool available at https://www.mdcalc.com/huzyqk-cmzm-edar-ort-narcotic-abuse.     Given the above, I believe the benefits of controlled substance therapy outweigh the risks. The reasons for prescribing controlled substances include non-narcotic, oral analgesic alternatives have been inadequate for pain control. Accordingly, I have discussed the risk and benefits, treatment plan, and alternative therapies with the patient.       Electronically signed by: Gavi Abbasi M.D., 3/16/2023 11:29 PM

## 2023-03-17 NOTE — ED TRIAGE NOTES
"Chief Complaint   Patient presents with    Ankle Pain     Pt reports that one week ago she hit her left ankle on a bed frame and today she reports that the pain in her heel became unbearable.      /89   Pulse (!) 103   Temp 36.6 °C (97.8 °F) (Temporal)   Resp 18   Ht 1.651 m (5' 5\")   Wt 81.6 kg (180 lb)   LMP 02/16/2023 (Approximate)   SpO2 100%   BMI 29.95 kg/m²     Pt is wheeled in and out of triage in a hospital wheelchair. Appropriate PPE worn throughout entire encounter. Pt placed back in the lobby and educated about triage process.    "

## 2023-03-17 NOTE — DISCHARGE INSTRUCTIONS
You were seen in the Emergency Department for ankle pain.    Xrays were completed without significant acute abnormalities.    Please use 1,000mg of tylenol or 600mg of ibuprofen every 6 hours as needed for pain.  Take antibiotics as directed.  Elevate is much as possible and ice as needed for pain.    Please follow up with your primary care physician or return here in 2 days for recheck if swelling and redness has not improved.    Return to the Emergency Department sooner with fevers, increasing pain or swelling, or other concerns.